# Patient Record
Sex: MALE | Race: WHITE | NOT HISPANIC OR LATINO | URBAN - METROPOLITAN AREA
[De-identification: names, ages, dates, MRNs, and addresses within clinical notes are randomized per-mention and may not be internally consistent; named-entity substitution may affect disease eponyms.]

---

## 2021-03-26 ENCOUNTER — INPATIENT (INPATIENT)
Facility: HOSPITAL | Age: 66
LOS: 4 days | Discharge: HOME HEALTH | DRG: 177 | End: 2021-03-31
Attending: INTERNAL MEDICINE | Admitting: EMERGENCY MEDICINE
Payer: MEDICARE

## 2021-03-26 VITALS — WEIGHT: 199.96 LBS | OXYGEN SATURATION: 86 %

## 2021-03-26 DIAGNOSIS — R09.02 HYPOXEMIA: ICD-10-CM

## 2021-03-26 LAB
ALBUMIN SERPL ELPH-MCNC: 2.6 G/DL — LOW (ref 3.3–5)
ALP SERPL-CCNC: 60 U/L — SIGNIFICANT CHANGE UP (ref 40–120)
ALT FLD-CCNC: 120 U/L — HIGH (ref 12–78)
ANION GAP SERPL CALC-SCNC: 13 MMOL/L — SIGNIFICANT CHANGE UP (ref 5–17)
AST SERPL-CCNC: 130 U/L — HIGH (ref 15–37)
BASE EXCESS BLDV CALC-SCNC: -1.6 MMOL/L — SIGNIFICANT CHANGE UP (ref -2–2)
BASOPHILS # BLD AUTO: 0.01 K/UL — SIGNIFICANT CHANGE UP (ref 0–0.2)
BASOPHILS NFR BLD AUTO: 0.1 % — SIGNIFICANT CHANGE UP (ref 0–2)
BILIRUB SERPL-MCNC: 0.7 MG/DL — SIGNIFICANT CHANGE UP (ref 0.2–1.2)
BUN SERPL-MCNC: 20 MG/DL — SIGNIFICANT CHANGE UP (ref 7–23)
CALCIUM SERPL-MCNC: 8.3 MG/DL — LOW (ref 8.5–10.1)
CHLORIDE SERPL-SCNC: 99 MMOL/L — SIGNIFICANT CHANGE UP (ref 96–108)
CO2 SERPL-SCNC: 22 MMOL/L — SIGNIFICANT CHANGE UP (ref 22–31)
CREAT SERPL-MCNC: 1.32 MG/DL — HIGH (ref 0.5–1.3)
D DIMER BLD IA.RAPID-MCNC: 345 NG/ML DDU — HIGH
EOSINOPHIL # BLD AUTO: 0 K/UL — SIGNIFICANT CHANGE UP (ref 0–0.5)
EOSINOPHIL NFR BLD AUTO: 0 % — SIGNIFICANT CHANGE UP (ref 0–6)
GLUCOSE SERPL-MCNC: 258 MG/DL — HIGH (ref 70–99)
HCO3 BLDV-SCNC: 22 MMOL/L — SIGNIFICANT CHANGE UP (ref 21–29)
HCT VFR BLD CALC: 42.7 % — SIGNIFICANT CHANGE UP (ref 39–50)
HGB BLD-MCNC: 14.5 G/DL — SIGNIFICANT CHANGE UP (ref 13–17)
IMM GRANULOCYTES NFR BLD AUTO: 1.1 % — SIGNIFICANT CHANGE UP (ref 0–1.5)
LACTATE SERPL-SCNC: 2.2 MMOL/L — HIGH (ref 0.7–2)
LYMPHOCYTES # BLD AUTO: 1.09 K/UL — SIGNIFICANT CHANGE UP (ref 1–3.3)
LYMPHOCYTES # BLD AUTO: 10.6 % — LOW (ref 13–44)
MCHC RBC-ENTMCNC: 29.7 PG — SIGNIFICANT CHANGE UP (ref 27–34)
MCHC RBC-ENTMCNC: 34 GM/DL — SIGNIFICANT CHANGE UP (ref 32–36)
MCV RBC AUTO: 87.3 FL — SIGNIFICANT CHANGE UP (ref 80–100)
MONOCYTES # BLD AUTO: 0.63 K/UL — SIGNIFICANT CHANGE UP (ref 0–0.9)
MONOCYTES NFR BLD AUTO: 6.1 % — SIGNIFICANT CHANGE UP (ref 2–14)
NEUTROPHILS # BLD AUTO: 8.46 K/UL — HIGH (ref 1.8–7.4)
NEUTROPHILS NFR BLD AUTO: 82.1 % — HIGH (ref 43–77)
NT-PROBNP SERPL-SCNC: 60 PG/ML — SIGNIFICANT CHANGE UP (ref 0–125)
PCO2 BLDV: 34 MMHG — LOW (ref 42–55)
PH BLDV: 7.42 — SIGNIFICANT CHANGE UP (ref 7.35–7.45)
PLATELET # BLD AUTO: 467 K/UL — HIGH (ref 150–400)
PO2 BLDV: 174 MMHG — HIGH (ref 25–45)
POTASSIUM SERPL-MCNC: 4 MMOL/L — SIGNIFICANT CHANGE UP (ref 3.5–5.3)
POTASSIUM SERPL-SCNC: 4 MMOL/L — SIGNIFICANT CHANGE UP (ref 3.5–5.3)
PROT SERPL-MCNC: 7.7 GM/DL — SIGNIFICANT CHANGE UP (ref 6–8.3)
RBC # BLD: 4.89 M/UL — SIGNIFICANT CHANGE UP (ref 4.2–5.8)
RBC # FLD: 12.8 % — SIGNIFICANT CHANGE UP (ref 10.3–14.5)
SAO2 % BLDV: 99 % — HIGH (ref 67–88)
SODIUM SERPL-SCNC: 134 MMOL/L — LOW (ref 135–145)
TROPONIN I SERPL-MCNC: <0.015 NG/ML — SIGNIFICANT CHANGE UP (ref 0.01–0.04)
WBC # BLD: 10.3 K/UL — SIGNIFICANT CHANGE UP (ref 3.8–10.5)
WBC # FLD AUTO: 10.3 K/UL — SIGNIFICANT CHANGE UP (ref 3.8–10.5)

## 2021-03-26 PROCEDURE — U0005: CPT

## 2021-03-26 PROCEDURE — 93005 ELECTROCARDIOGRAM TRACING: CPT

## 2021-03-26 PROCEDURE — 85027 COMPLETE CBC AUTOMATED: CPT

## 2021-03-26 PROCEDURE — 82962 GLUCOSE BLOOD TEST: CPT

## 2021-03-26 PROCEDURE — 82248 BILIRUBIN DIRECT: CPT

## 2021-03-26 PROCEDURE — 85610 PROTHROMBIN TIME: CPT

## 2021-03-26 PROCEDURE — 83605 ASSAY OF LACTIC ACID: CPT

## 2021-03-26 PROCEDURE — 80053 COMPREHEN METABOLIC PANEL: CPT

## 2021-03-26 PROCEDURE — 36415 COLL VENOUS BLD VENIPUNCTURE: CPT

## 2021-03-26 PROCEDURE — U0003: CPT

## 2021-03-26 PROCEDURE — 99285 EMERGENCY DEPT VISIT HI MDM: CPT

## 2021-03-26 PROCEDURE — 80076 HEPATIC FUNCTION PANEL: CPT

## 2021-03-26 PROCEDURE — 94640 AIRWAY INHALATION TREATMENT: CPT

## 2021-03-26 PROCEDURE — 83036 HEMOGLOBIN GLYCOSYLATED A1C: CPT

## 2021-03-26 PROCEDURE — 71045 X-RAY EXAM CHEST 1 VIEW: CPT | Mod: 26

## 2021-03-26 PROCEDURE — 71275 CT ANGIOGRAPHY CHEST: CPT | Mod: 26

## 2021-03-26 PROCEDURE — 99223 1ST HOSP IP/OBS HIGH 75: CPT

## 2021-03-26 PROCEDURE — 86769 SARS-COV-2 COVID-19 ANTIBODY: CPT

## 2021-03-26 PROCEDURE — 94761 N-INVAS EAR/PLS OXIMETRY MLT: CPT

## 2021-03-26 PROCEDURE — 93010 ELECTROCARDIOGRAM REPORT: CPT

## 2021-03-26 PROCEDURE — 87086 URINE CULTURE/COLONY COUNT: CPT

## 2021-03-26 PROCEDURE — C9399: CPT

## 2021-03-26 PROCEDURE — 86803 HEPATITIS C AB TEST: CPT

## 2021-03-26 RX ORDER — ALBUTEROL 90 UG/1
2 AEROSOL, METERED ORAL ONCE
Refills: 0 | Status: COMPLETED | OUTPATIENT
Start: 2021-03-26 | End: 2021-03-26

## 2021-03-26 RX ORDER — METFORMIN HYDROCHLORIDE 850 MG/1
1 TABLET ORAL
Qty: 0 | Refills: 0 | DISCHARGE

## 2021-03-26 RX ORDER — ASPIRIN/CALCIUM CARB/MAGNESIUM 324 MG
1 TABLET ORAL
Qty: 0 | Refills: 0 | DISCHARGE

## 2021-03-26 RX ORDER — DAPAGLIFLOZIN 10 MG/1
1 TABLET, FILM COATED ORAL
Qty: 0 | Refills: 0 | DISCHARGE

## 2021-03-26 RX ORDER — DEXAMETHASONE 0.5 MG/5ML
6 ELIXIR ORAL ONCE
Refills: 0 | Status: COMPLETED | OUTPATIENT
Start: 2021-03-26 | End: 2021-03-26

## 2021-03-26 RX ORDER — OMEGA-3 ACID ETHYL ESTERS 1 G
1 CAPSULE ORAL
Qty: 0 | Refills: 0 | DISCHARGE

## 2021-03-26 RX ORDER — ACETAMINOPHEN 500 MG
650 TABLET ORAL ONCE
Refills: 0 | Status: COMPLETED | OUTPATIENT
Start: 2021-03-26 | End: 2021-03-26

## 2021-03-26 RX ORDER — SODIUM CHLORIDE 9 MG/ML
1000 INJECTION INTRAMUSCULAR; INTRAVENOUS; SUBCUTANEOUS ONCE
Refills: 0 | Status: COMPLETED | OUTPATIENT
Start: 2021-03-26 | End: 2021-03-26

## 2021-03-26 RX ORDER — LOSARTAN POTASSIUM 100 MG/1
1 TABLET, FILM COATED ORAL
Qty: 0 | Refills: 0 | DISCHARGE

## 2021-03-26 RX ADMIN — Medication 650 MILLIGRAM(S): at 22:18

## 2021-03-26 RX ADMIN — ALBUTEROL 2 PUFF(S): 90 AEROSOL, METERED ORAL at 19:40

## 2021-03-26 RX ADMIN — Medication 650 MILLIGRAM(S): at 19:40

## 2021-03-26 RX ADMIN — SODIUM CHLORIDE 1000 MILLILITER(S): 9 INJECTION INTRAMUSCULAR; INTRAVENOUS; SUBCUTANEOUS at 22:18

## 2021-03-26 RX ADMIN — Medication 6 MILLIGRAM(S): at 19:40

## 2021-03-26 NOTE — H&P ADULT - NSHPPHYSICALEXAM_GEN_ALL_CORE
Vital Signs Last 24 Hrs  T(C): 36.9 (26 Mar 2021 22:41), Max: 37.8 (26 Mar 2021 19:32)  T(F): 98.4 (26 Mar 2021 22:41), Max: 100.1 (26 Mar 2021 19:32)  HR: 64 (26 Mar 2021 22:41) (64 - 88)  BP: 101/71 (26 Mar 2021 22:41) (99/61 - 113/67)  BP(mean): 81 (26 Mar 2021 22:41) (80 - 81)  RR: 24 (26 Mar 2021 22:41) (22 - 24)  SpO2: 95% (26 Mar 2021 22:41) (86% - 96%)

## 2021-03-26 NOTE — ED PROVIDER NOTE - CARE PLAN
Principal Discharge DX:	Hypoxia  Secondary Diagnosis:	Viral pneumonia  Secondary Diagnosis:	SOB (shortness of breath)

## 2021-03-26 NOTE — H&P ADULT - HISTORY OF PRESENT ILLNESS
64 y/o M PMHx significant for CAD, Hypertension, Hyperlipidemia, and diabetes mellitus type 2 who tested positive for COVID-19 2 weeks ago presents to  for further evaluation and management of worsening cough, and shortness of breath. The patient additionally reports generalized weakness and fatigue. The patient was noted to be hypoxic SpO2 80s on room air and 90-92% on 2L/min nasal cannula.  Labs => LA 2.2, Na 134, BUN/Cr 20/1.32, Glu 258, Alb 2.6, AST//120, D-dimer 345, TnI (-) x 1. CTA Chest => No obvious pulmonary embolus. Bilateral pulmonary opacities, reflecting COVID-19 pneumonia given history.

## 2021-03-26 NOTE — ED PROVIDER NOTE - ATTENDING CONTRIBUTION TO CARE
I, Marlin Thompson MD,  performed the initial face to face bedside interview with this patient regarding history of present illness, review of symptoms and relevant past medical, social and family history.  I completed an independent physical examination.  I was the initial provider who evaluated this patient. I have signed out the follow up of any pending tests (i.e. labs, radiological studies) to the ACP.  I have communicated the patient’s plan of care and disposition with the ACP.  The history, relevant review of systems, past medical and surgical history, medical decision making, and physical examination was documented by the scribe in my presence and I attest to the accuracy of the documentation.

## 2021-03-26 NOTE — H&P ADULT - ASSESSMENT
66 y/o M PMHx significant for CAD, Hypertension, Hyperlipidemia, and diabetes mellitus type 2 who tested positive for COVID-19 2 weeks ago presents to  for further evaluation and management of worsening cough, and shortness of breath. The patient additionally reports generalized weakness and fatigue. The patient was noted to be hypoxic SpO2 80s on room air and 90-92% on 2L/min nasal cannula.  Labs => LA 2.2, Na 134, BUN/Cr 20/1.32, Glu 258, Alb 2.6, AST//120, D-dimer 345, TnI (-) x 1. CTA Chest => No obvious pulmonary embolus. Bilateral pulmonary opacities, reflecting COVID-19 pneumonia given history.    #Viral Pneumonia secondary to Novel Coronavirus Infection  ~admit to Medicine  ~cont. with O2 as needed via nasal cannula and up-titrate as needed. If on non-rebreather mask, start continuous oximetry monitoring.  ~cont. Dexamethasone 6mg IVP daily  ~cont. Remdesevir per protocol  ~cont. anti-pyretics with Acetaminophen 650 mg PO q4h PRN fever. Limit use of NSAIDs.  ~cont. HFA albuterol Q6 hour PRN via MDI. Would avoid nebulized preparations to limit risk of aerosol formation.  ~f/u am labs     #CAD/Hypertension  ~cont. ASA 81mg po daily  ~cont. Losartan 25mg po daily    #Hyperlipidemia  ~takes Lovaza 1000mg po bid    #Diabetes mellitus type 2  ~FS qAC/HS  ~cont. ISS per protocol  #Goals of Care discussion  ~at this time the patient is Full code    #Vte ppx  ~cont. LMWH 40 mg SQ daily

## 2021-03-26 NOTE — ED ADULT NURSE NOTE - OBJECTIVE STATEMENT
Pt is a 64 y/o male, A &O x 4. presents to ED with shortness of breath, was Covid + 4 weeks ago. Pt sent to ED by PCP. Denies chest pain.

## 2021-03-26 NOTE — ED PROVIDER NOTE - OBJECTIVE STATEMENT
66 y/o male, tested positive for COVID 2 weeks ago, with PMHx of recent onset of diabetes with daily medications at this time presents to the ED c/o cough and SOB. Pt states that 4 weeks ago he went to urgent care center, states he tested positive for COVID. Pt states that since then he has been quarantining, reports associated weakness, fatigue, and mild cough, otherwise with no symptoms. Pt reports he called PMD Mike today, who advised pt come to ED for further evaluation. Pt reports no medications or treatment taken for COVID, and otherwise no daily medications. Denies subjective fevers, chills, CP, SOB, abd pain. No other complaints at this time.

## 2021-03-26 NOTE — ED PROVIDER NOTE - PROGRESS NOTE DETAILS
64 yo male with a PMH of dm, htn, recently tested positive 4 weeks ago presents with foggy brain, sob, kilpatrick x 6 weeks. Pt has been having increasing symptoms with weakness and cough. Spoke with his PMD and sent to the ER for symptoms. Pt with O2 saturation in the high 80s on RA and at 90-92% on 2 L.Will check labs, covid, cxr, ekg, meds, Likely admit. -Richard Alonso PA-C CTA unremarkable. Will admit pt for covid pna, sob, kilpatrick. Pt was made aware. -Richard Alonso PA-C

## 2021-03-27 LAB
A1C WITH ESTIMATED AVERAGE GLUCOSE RESULT: 11.3 % — HIGH (ref 4–5.6)
ALBUMIN SERPL ELPH-MCNC: 2.5 G/DL — LOW (ref 3.3–5)
ALBUMIN SERPL ELPH-MCNC: 2.7 G/DL — LOW (ref 3.3–5)
ALP SERPL-CCNC: 64 U/L — SIGNIFICANT CHANGE UP (ref 40–120)
ALP SERPL-CCNC: 65 U/L — SIGNIFICANT CHANGE UP (ref 40–120)
ALT FLD-CCNC: 141 U/L — HIGH (ref 12–78)
ALT FLD-CCNC: 143 U/L — HIGH (ref 12–78)
ANION GAP SERPL CALC-SCNC: 10 MMOL/L — SIGNIFICANT CHANGE UP (ref 5–17)
AST SERPL-CCNC: 111 U/L — HIGH (ref 15–37)
AST SERPL-CCNC: 111 U/L — HIGH (ref 15–37)
BILIRUB DIRECT SERPL-MCNC: 0.1 MG/DL — SIGNIFICANT CHANGE UP (ref 0–0.2)
BILIRUB INDIRECT FLD-MCNC: 0.5 MG/DL — SIGNIFICANT CHANGE UP (ref 0.2–1)
BILIRUB SERPL-MCNC: 0.6 MG/DL — SIGNIFICANT CHANGE UP (ref 0.2–1.2)
BILIRUB SERPL-MCNC: 0.6 MG/DL — SIGNIFICANT CHANGE UP (ref 0.2–1.2)
BUN SERPL-MCNC: 28 MG/DL — HIGH (ref 7–23)
CALCIUM SERPL-MCNC: 8.9 MG/DL — SIGNIFICANT CHANGE UP (ref 8.5–10.1)
CHLORIDE SERPL-SCNC: 100 MMOL/L — SIGNIFICANT CHANGE UP (ref 96–108)
CO2 SERPL-SCNC: 24 MMOL/L — SIGNIFICANT CHANGE UP (ref 22–31)
COVID-19 SPIKE DOMAIN AB INTERP: POSITIVE
COVID-19 SPIKE DOMAIN ANTIBODY RESULT: 91.4 U/ML — HIGH
CREAT SERPL-MCNC: 1.28 MG/DL — SIGNIFICANT CHANGE UP (ref 0.5–1.3)
CRP SERPL-MCNC: 135 MG/L — HIGH
ESTIMATED AVERAGE GLUCOSE: 278 MG/DL — HIGH (ref 68–114)
FERRITIN SERPL-MCNC: 2675 NG/ML — HIGH (ref 30–400)
GLUCOSE SERPL-MCNC: 324 MG/DL — HIGH (ref 70–99)
HCT VFR BLD CALC: 41.6 % — SIGNIFICANT CHANGE UP (ref 39–50)
HCV AB S/CO SERPL IA: 0.09 S/CO — SIGNIFICANT CHANGE UP (ref 0–0.99)
HCV AB SERPL-IMP: SIGNIFICANT CHANGE UP
HGB BLD-MCNC: 14.1 G/DL — SIGNIFICANT CHANGE UP (ref 13–17)
INR BLD: 1.43 RATIO — HIGH (ref 0.88–1.16)
MCHC RBC-ENTMCNC: 29.7 PG — SIGNIFICANT CHANGE UP (ref 27–34)
MCHC RBC-ENTMCNC: 33.9 GM/DL — SIGNIFICANT CHANGE UP (ref 32–36)
MCV RBC AUTO: 87.8 FL — SIGNIFICANT CHANGE UP (ref 80–100)
PLATELET # BLD AUTO: 556 K/UL — HIGH (ref 150–400)
POTASSIUM SERPL-MCNC: 4.8 MMOL/L — SIGNIFICANT CHANGE UP (ref 3.5–5.3)
POTASSIUM SERPL-SCNC: 4.8 MMOL/L — SIGNIFICANT CHANGE UP (ref 3.5–5.3)
PROCALCITONIN SERPL-MCNC: 0.53 NG/ML — HIGH (ref 0.02–0.1)
PROT SERPL-MCNC: 7.8 GM/DL — SIGNIFICANT CHANGE UP (ref 6–8.3)
PROT SERPL-MCNC: 7.8 GM/DL — SIGNIFICANT CHANGE UP (ref 6–8.3)
PROTHROM AB SERPL-ACNC: 16.5 SEC — HIGH (ref 10.6–13.6)
RBC # BLD: 4.74 M/UL — SIGNIFICANT CHANGE UP (ref 4.2–5.8)
RBC # FLD: 13.2 % — SIGNIFICANT CHANGE UP (ref 10.3–14.5)
SARS-COV-2 IGG+IGM SERPL QL IA: 91.4 U/ML — HIGH
SARS-COV-2 IGG+IGM SERPL QL IA: POSITIVE
SARS-COV-2 RNA SPEC QL NAA+PROBE: DETECTED
SODIUM SERPL-SCNC: 134 MMOL/L — LOW (ref 135–145)
WBC # BLD: 9.14 K/UL — SIGNIFICANT CHANGE UP (ref 3.8–10.5)
WBC # FLD AUTO: 9.14 K/UL — SIGNIFICANT CHANGE UP (ref 3.8–10.5)

## 2021-03-27 PROCEDURE — 99233 SBSQ HOSP IP/OBS HIGH 50: CPT | Mod: CS

## 2021-03-27 RX ORDER — REMDESIVIR 5 MG/ML
INJECTION INTRAVENOUS
Refills: 0 | Status: COMPLETED | OUTPATIENT
Start: 2021-03-27 | End: 2021-03-31

## 2021-03-27 RX ORDER — INSULIN LISPRO 100/ML
15 VIAL (ML) SUBCUTANEOUS ONCE
Refills: 0 | Status: COMPLETED | OUTPATIENT
Start: 2021-03-27 | End: 2021-03-27

## 2021-03-27 RX ORDER — DEXTROSE 50 % IN WATER 50 %
12.5 SYRINGE (ML) INTRAVENOUS ONCE
Refills: 0 | Status: DISCONTINUED | OUTPATIENT
Start: 2021-03-27 | End: 2021-03-31

## 2021-03-27 RX ORDER — INSULIN GLARGINE 100 [IU]/ML
15 INJECTION, SOLUTION SUBCUTANEOUS AT BEDTIME
Refills: 0 | Status: DISCONTINUED | OUTPATIENT
Start: 2021-03-27 | End: 2021-03-28

## 2021-03-27 RX ORDER — ASPIRIN/CALCIUM CARB/MAGNESIUM 324 MG
81 TABLET ORAL DAILY
Refills: 0 | Status: DISCONTINUED | OUTPATIENT
Start: 2021-03-27 | End: 2021-03-31

## 2021-03-27 RX ORDER — SODIUM CHLORIDE 9 MG/ML
1000 INJECTION, SOLUTION INTRAVENOUS
Refills: 0 | Status: DISCONTINUED | OUTPATIENT
Start: 2021-03-27 | End: 2021-03-31

## 2021-03-27 RX ORDER — INSULIN LISPRO 100/ML
2 VIAL (ML) SUBCUTANEOUS ONCE
Refills: 0 | Status: COMPLETED | OUTPATIENT
Start: 2021-03-27 | End: 2021-03-27

## 2021-03-27 RX ORDER — ACETAMINOPHEN 500 MG
650 TABLET ORAL EVERY 4 HOURS
Refills: 0 | Status: DISCONTINUED | OUTPATIENT
Start: 2021-03-27 | End: 2021-03-31

## 2021-03-27 RX ORDER — REMDESIVIR 5 MG/ML
100 INJECTION INTRAVENOUS EVERY 24 HOURS
Refills: 0 | Status: COMPLETED | OUTPATIENT
Start: 2021-03-28 | End: 2021-03-31

## 2021-03-27 RX ORDER — ALBUTEROL 90 UG/1
2 AEROSOL, METERED ORAL EVERY 4 HOURS
Refills: 0 | Status: DISCONTINUED | OUTPATIENT
Start: 2021-03-27 | End: 2021-03-31

## 2021-03-27 RX ORDER — DEXTROSE 50 % IN WATER 50 %
25 SYRINGE (ML) INTRAVENOUS ONCE
Refills: 0 | Status: DISCONTINUED | OUTPATIENT
Start: 2021-03-27 | End: 2021-03-31

## 2021-03-27 RX ORDER — ONDANSETRON 8 MG/1
4 TABLET, FILM COATED ORAL EVERY 6 HOURS
Refills: 0 | Status: DISCONTINUED | OUTPATIENT
Start: 2021-03-27 | End: 2021-03-31

## 2021-03-27 RX ORDER — INSULIN LISPRO 100/ML
VIAL (ML) SUBCUTANEOUS AT BEDTIME
Refills: 0 | Status: DISCONTINUED | OUTPATIENT
Start: 2021-03-27 | End: 2021-03-31

## 2021-03-27 RX ORDER — INSULIN GLARGINE 100 [IU]/ML
15 INJECTION, SOLUTION SUBCUTANEOUS ONCE
Refills: 0 | Status: COMPLETED | OUTPATIENT
Start: 2021-03-27 | End: 2021-03-27

## 2021-03-27 RX ORDER — GUAIFENESIN/DEXTROMETHORPHAN 600MG-30MG
10 TABLET, EXTENDED RELEASE 12 HR ORAL EVERY 4 HOURS
Refills: 0 | Status: DISCONTINUED | OUTPATIENT
Start: 2021-03-27 | End: 2021-03-31

## 2021-03-27 RX ORDER — GLUCAGON INJECTION, SOLUTION 0.5 MG/.1ML
1 INJECTION, SOLUTION SUBCUTANEOUS ONCE
Refills: 0 | Status: DISCONTINUED | OUTPATIENT
Start: 2021-03-27 | End: 2021-03-31

## 2021-03-27 RX ORDER — LOSARTAN POTASSIUM 100 MG/1
25 TABLET, FILM COATED ORAL DAILY
Refills: 0 | Status: DISCONTINUED | OUTPATIENT
Start: 2021-03-27 | End: 2021-03-31

## 2021-03-27 RX ORDER — INSULIN LISPRO 100/ML
VIAL (ML) SUBCUTANEOUS
Refills: 0 | Status: DISCONTINUED | OUTPATIENT
Start: 2021-03-27 | End: 2021-03-31

## 2021-03-27 RX ORDER — DEXTROSE 50 % IN WATER 50 %
15 SYRINGE (ML) INTRAVENOUS ONCE
Refills: 0 | Status: DISCONTINUED | OUTPATIENT
Start: 2021-03-27 | End: 2021-03-31

## 2021-03-27 RX ORDER — INSULIN LISPRO 100/ML
VIAL (ML) SUBCUTANEOUS AT BEDTIME
Refills: 0 | Status: DISCONTINUED | OUTPATIENT
Start: 2021-03-27 | End: 2021-03-27

## 2021-03-27 RX ORDER — ENOXAPARIN SODIUM 100 MG/ML
40 INJECTION SUBCUTANEOUS DAILY
Refills: 0 | Status: DISCONTINUED | OUTPATIENT
Start: 2021-03-27 | End: 2021-03-31

## 2021-03-27 RX ORDER — INSULIN GLARGINE 100 [IU]/ML
10 INJECTION, SOLUTION SUBCUTANEOUS
Refills: 0 | Status: DISCONTINUED | OUTPATIENT
Start: 2021-03-27 | End: 2021-03-27

## 2021-03-27 RX ORDER — REMDESIVIR 5 MG/ML
200 INJECTION INTRAVENOUS EVERY 24 HOURS
Refills: 0 | Status: COMPLETED | OUTPATIENT
Start: 2021-03-27 | End: 2021-03-27

## 2021-03-27 RX ORDER — DEXAMETHASONE 0.5 MG/5ML
6 ELIXIR ORAL DAILY
Refills: 0 | Status: DISCONTINUED | OUTPATIENT
Start: 2021-03-27 | End: 2021-03-31

## 2021-03-27 RX ADMIN — Medication 2 UNIT(S): at 02:34

## 2021-03-27 RX ADMIN — Medication 8: at 22:51

## 2021-03-27 RX ADMIN — Medication 4: at 09:19

## 2021-03-27 RX ADMIN — ENOXAPARIN SODIUM 40 MILLIGRAM(S): 100 INJECTION SUBCUTANEOUS at 09:22

## 2021-03-27 RX ADMIN — Medication 6 MILLIGRAM(S): at 09:22

## 2021-03-27 RX ADMIN — Medication 81 MILLIGRAM(S): at 09:22

## 2021-03-27 RX ADMIN — INSULIN GLARGINE 15 UNIT(S): 100 INJECTION, SOLUTION SUBCUTANEOUS at 22:51

## 2021-03-27 RX ADMIN — REMDESIVIR 500 MILLIGRAM(S): 5 INJECTION INTRAVENOUS at 03:10

## 2021-03-27 RX ADMIN — Medication 5: at 16:31

## 2021-03-27 RX ADMIN — Medication 15 UNIT(S): at 12:11

## 2021-03-27 RX ADMIN — INSULIN GLARGINE 10 UNIT(S): 100 INJECTION, SOLUTION SUBCUTANEOUS at 16:32

## 2021-03-27 NOTE — CONSULT NOTE ADULT - SUBJECTIVE AND OBJECTIVE BOX
Patient is a 65y old  Male who presents with a chief complaint of Cough, fatigue, weakness (27 Mar 2021 10:38)    HPI:  66 y/o M PMHx significant for CAD, Hypertension, Hyperlipidemia, and diabetes mellitus type 2 who tested positive for COVID-19 2 weeks ago presents to  for further evaluation and management of worsening cough, and shortness of breath. The patient additionally reports generalized weakness and fatigue. The patient was noted to be hypoxic SpO2 80s on room air and 90-92% on 2L/min nasal cannula. Labs => LA 2.2, Na 134, BUN/Cr 20/1.32, Glu 258, Alb 2.6, AST//120, D-dimer 345, TnI (-) x 1. CTA Chest => No obvious pulmonary embolus. Bilateral pulmonary opacities, reflecting COVID-19 pneumonia given history. Was given remdesivir, decadron.       PMH: as above  PSH: as above  Meds: per reconciliation sheet, noted below  MEDICATIONS  (STANDING):  aspirin enteric coated 81 milliGRAM(s) Oral daily  dexAMETHasone  Injectable 6 milliGRAM(s) IV Push daily  dextrose 40% Gel 15 Gram(s) Oral once  dextrose 5%. 1000 milliLiter(s) (50 mL/Hr) IV Continuous <Continuous>  dextrose 5%. 1000 milliLiter(s) (100 mL/Hr) IV Continuous <Continuous>  dextrose 50% Injectable 25 Gram(s) IV Push once  dextrose 50% Injectable 12.5 Gram(s) IV Push once  dextrose 50% Injectable 25 Gram(s) IV Push once  enoxaparin Injectable 40 milliGRAM(s) SubCutaneous daily  glucagon  Injectable 1 milliGRAM(s) IntraMuscular once  insulin lispro (ADMELOG) corrective regimen sliding scale   SubCutaneous three times a day before meals  insulin lispro (ADMELOG) corrective regimen sliding scale   SubCutaneous at bedtime  insulin lispro Injectable (ADMELOG) 15 Unit(s) SubCutaneous once  losartan 25 milliGRAM(s) Oral daily  remdesivir  IVPB   IV Intermittent       Allergies    No Known Allergies    Intolerances      Social: no smoking, no alcohol, no illegal drugs; no recent travel, no exposure to TB  FAMILY HISTORY:  No pertinent family history in first degree relatives       no history of premature cardiovascular disease in first degree relatives    ROS: no HA, no dizziness, no sore throat, no blurry vision, no CP, no palpitations, no abdominal pain, no diarrhea, no N/V, no dysuria, no leg pain, no claudication, no rash, no joint aches, no rectal pain or bleeding, no night sweats  All other systems reviewed and are negative    Vital Signs Last 24 Hrs  T(C): 36.3 (27 Mar 2021 08:31), Max: 37.8 (26 Mar 2021 19:32)  T(F): 97.3 (27 Mar 2021 08:31), Max: 100.1 (26 Mar 2021 19:32)  HR: 60 (27 Mar 2021 08:31) (60 - 88)  BP: 111/67 (27 Mar 2021 08:31) (99/61 - 114/77)  BP(mean): 94 (27 Mar 2021 02:33) (80 - 94)  RR: 20 (27 Mar 2021 08:31) (20 - 24)  SpO2: 94% (27 Mar 2021 08:31) (86% - 96%)  Daily     Daily     PE:  Constitutional: frail looking  HEENT: NC/AT, EOMI, PERRLA, conjunctivae clear; ears and nose atraumatic; pharynx benign  Neck: supple; thyroid not palpable  Back: no tenderness  Respiratory: decreased breath sounds, crackles   Cardiovascular: S1S2 regular, no murmurs  Abdomen: soft, not tender, not distended, positive BS; liver and spleen WNL  Genitourinary: no suprapubic tenderness  Lymphatic: no LN palpable  Musculoskeletal: no muscle tenderness, no joint swelling or tenderness  Extremities: no pedal edema  Neurological/ Psychiatric: AxOx3, Judgement and insight normal;  moving all extremities  Skin: no rashes; no palpable lesions    Labs: all available labs reviewed                        14.1   9.14  )-----------( 556      ( 27 Mar 2021 08:32 )             41.6     03-27    134<L>  |  100  |  28<H>  ----------------------------<  324<H>  4.8   |  24  |  1.28    Ca    8.9      27 Mar 2021 08:32    TPro  7.8  /  Alb  2.5<L>  /  TBili  0.6  /  DBili  0.1  /  AST  111<H>  /  ALT  141<H>  /  AlkPhos  64  03-27     LIVER FUNCTIONS - ( 27 Mar 2021 08:32 )  Alb: 2.5 g/dL / Pro: 7.8 gm/dL / ALK PHOS: 64 U/L / ALT: 141 U/L / AST: 111 U/L / GGT: x           Radiology: all available radiological tests reviewed    EXAM:  CT ANGIO CHEST PE PROTOCOL IC                             PROCEDURE DATE:  03/26/2021          INTERPRETATION:  CLINICAL INFORMATION: Positive d-dimer, cough, shortness of breath, positive COVID-19 2 weeks ago, assess PE.    PROCEDURE: CT angiography of the chest was performed with intravenous contrast utilizing dedicated PE protocol. Coronal and sagittal reconstruction images were obtained. Axial MIP images were obtained from a separate workstation.    CONTRAST/COMPLICATIONS:  IV Contrast: Omnipaque 350  90 cc administered   10 cc discarded  Oral Contrast: NONE  Complications: None reported at time of study completion    COMPARISON: None.    FINDINGS: Artifact from the patient's arm and respiratory motion degrades images.    LUNGS AND AIRWAYS: Patent central airways. Predominantly peripheral linear/ground glass opacities in both lungs with mildly patchy opacities at the lung bases.  PLEURA: Trace bilateral pleural effusion. No pneumothorax.  HEART: Cardiomegaly. No pericardial effusion.  VESSELS: Adequate contrast opacification of the pulmonary arteries. No obvious pulmonary embolus. Atherosclerosis. Normal caliber of the thoracic aorta. Ectatic ascending aorta (4.5 cm in AP diameter).  MEDIASTINUM AND ANDREA: Subcentimeter lymph nodes without lymphadenopathy.  CHEST WALL AND LOWER NECK: Mild bilateral gynecomastia.  UPPER ABDOMEN: Suggests a fatty infiltration of the liver with sparing near the gallbladder fossa. Fatty atrophy of the visualized pancreas. Nonspecific bilateral perinephric stranding. Colon diverticulosis.  BONES: Degenerative changes of the spine. Chronic mild anterior wedging of the thoracolumbar junction.    IMPRESSION:    No obvious pulmonary embolus.    Bilateral pulmonary opacities, reflecting COVID-19 pneumonia given history.    Additional findings as described.          Advanced directives addressed: full resuscitation

## 2021-03-27 NOTE — PATIENT PROFILE ADULT - NSPROEXTENSIONSOFSELF_GEN_A_NUR
Patient states he brought a pair of glasses to the ED. No glasses on patient's arrival to unit. Call made to security and no glasses confirmed to be returned to security./none

## 2021-03-27 NOTE — CONSULT NOTE ADULT - ASSESSMENT
64 y/o M PMHx significant for CAD, Hypertension, Hyperlipidemia, and diabetes mellitus type 2 who tested positive for COVID-19 2 weeks ago presents to  for further evaluation and management of worsening cough, and shortness of breath. The patient additionally reports generalized weakness and fatigue. The patient was noted to be hypoxic SpO2 80s on room air and 90-92% on 2L/min nasal cannula. Labs => LA 2.2, Na 134, BUN/Cr 20/1.32, Glu 258, Alb 2.6, AST//120, D-dimer 345, TnI (-) x 1. CTA Chest => No obvious pulmonary embolus. Bilateral pulmonary opacities, reflecting COVID-19 pneumonia given history. Was given remdesivir, decadron.     1. acute respiratory failure. covid-19 viral syndrome. multifocal pneumonia   - agree with decadron 6mg daily  - on remdesivir per protocol monitor renal/hepatic function closely  - continue with steroids + antiviral  - monitor temps  - isolation precautions  - observe off antibiotics  - f/u inflammatory markers  - supportive care    2. other issues - care per medicine

## 2021-03-27 NOTE — PROGRESS NOTE ADULT - SUBJECTIVE AND OBJECTIVE BOX
HOSPITALIST PROGRESS NOTE:  SUBJECTIVE:  PCP:  Chief Complaint: Patient is a 65y old  Male who presents with a chief complaint of Cough, fatigue, weakness (26 Mar 2021 22:59)      HPI:  66 y/o M PMHx significant for CAD, Hypertension, Hyperlipidemia, and diabetes mellitus type 2 who tested positive for COVID-19 2 weeks ago presents to  for further evaluation and management of worsening cough, and shortness of breath. The patient additionally reports generalized weakness and fatigue. The patient was noted to be hypoxic SpO2 80s on room air and 90-92% on 2L/min nasal cannula.  Labs => LA 2.2, Na 134, BUN/Cr 20/1.32, Glu 258, Alb 2.6, AST//120, D-dimer 345, TnI (-) x 1. CTA Chest => No obvious pulmonary embolus. Bilateral pulmonary opacities, reflecting COVID-19 pneumonia given history.   (26 Mar 2021 22:59)    3/27:  Above reviewed       Allergies:  No Known Allergies    REVIEW OF SYSTEMS:  See HPI. All other review of systems is negative unless indicated above.     OBJECTIVE  Physical Exam:  Vital Signs:    Weight (kg): 90.7 (03-26 @ 18:36)  Vital Signs Last 24 Hrs  T(C): 36.3 (27 Mar 2021 08:31), Max: 37.8 (26 Mar 2021 19:32)  T(F): 97.3 (27 Mar 2021 08:31), Max: 100.1 (26 Mar 2021 19:32)  HR: 60 (27 Mar 2021 08:31) (60 - 88)  BP: 111/67 (27 Mar 2021 08:31) (99/61 - 114/77)  BP(mean): 94 (27 Mar 2021 02:33) (80 - 94)  RR: 20 (27 Mar 2021 08:31) (20 - 24)  SpO2: 94% (27 Mar 2021 08:31) (86% - 96%)  I&O's Summary      Constitutional: NAD, awake and alert, well-developed  Neurological: AAO x 3, no focal deficits  HEENT: PERRLA, EOMI, MMM  Neck: Soft and supple, No LAD, No JVD  Respiratory: Breath sounds are clear bilaterally, No wheezing, rales or rhonchi  Cardiovascular: S1 and S2, regular rate and rhythm; no Murmurs, gallops or rubs  Gastrointestinal: Bowel Sounds present, soft, nontender, nondistended, no guarding, no rebound tenderness  Back: No CVA tenderness   Extremities: No peripheral edema  Vascular: 2+ peripheral pulses  Musculoskeletal: 5/5 strength b/l upper and lower extremities  Skin: No rashes  Breast: Deferred  Rectal: Deferred    MEDICATIONS  (STANDING):  aspirin enteric coated 81 milliGRAM(s) Oral daily  dexAMETHasone  Injectable 6 milliGRAM(s) IV Push daily  dextrose 40% Gel 15 Gram(s) Oral once  dextrose 5%. 1000 milliLiter(s) (50 mL/Hr) IV Continuous <Continuous>  dextrose 5%. 1000 milliLiter(s) (100 mL/Hr) IV Continuous <Continuous>  dextrose 50% Injectable 25 Gram(s) IV Push once  dextrose 50% Injectable 12.5 Gram(s) IV Push once  dextrose 50% Injectable 25 Gram(s) IV Push once  enoxaparin Injectable 40 milliGRAM(s) SubCutaneous daily  glucagon  Injectable 1 milliGRAM(s) IntraMuscular once  insulin lispro (ADMELOG) corrective regimen sliding scale   SubCutaneous three times a day before meals  insulin lispro (ADMELOG) corrective regimen sliding scale   SubCutaneous at bedtime  losartan 25 milliGRAM(s) Oral daily  remdesivir  IVPB   IV Intermittent       LABS: All Labs Reviewed:                        14.1   9.14  )-----------( 556      ( 27 Mar 2021 08:32 )             41.6     03-27    134<L>  |  100  |  28<H>  ----------------------------<  324<H>  4.8   |  24  |  1.28    Ca    8.9      27 Mar 2021 08:32    TPro  7.8  /  Alb  2.5<L>  /  TBili  0.6  /  DBili  0.1  /  AST  111<H>  /  ALT  141<H>  /  AlkPhos  64  03-27    PT/INR - ( 27 Mar 2021 08:32 )   PT: 16.5 sec;   INR: 1.43 ratio           CARDIAC MARKERS ( 26 Mar 2021 18:55 )  <0.015 ng/mL / x     / x     / x     / x            RADIOLOGY/EKG:    < from: Xray Chest 1 View-PORTABLE IMMEDIATE (03.26.21 @ 19:43) >      INTERPRETATION:  AP erect chest on March 26, 2021 at 7:31 PM.    COMPARISON: None available.    Heart magnified by technique.    Scattered mid laterallung infiltrates was some extension to the lung bases noted. Above findings are consistent with Covid pneumonia.    IMPRESSION: Bilateral infiltrates as above.        < end of copied text >  < from: CT Angio Chest PE Protocol w/ IV Cont (03.26.21 @ 21:06) >    IMPRESSION:    No obvious pulmonary embolus.    Bilateral pulmonary opacities, reflecting COVID-19 pneumonia given history.    Additional findings as described.        < end of copied text >           HOSPITALIST PROGRESS NOTE:  SUBJECTIVE:  PCP:  Chief Complaint: Patient is a 65y old  Male who presents with a chief complaint of Cough, fatigue, weakness (26 Mar 2021 22:59)      HPI:  66 y/o M PMHx significant for CAD, Hypertension, Hyperlipidemia, and diabetes mellitus type 2 who tested positive for COVID-19 2 weeks ago presents to  for further evaluation and management of worsening cough, and shortness of breath. The patient additionally reports generalized weakness and fatigue. The patient was noted to be hypoxic SpO2 80s on room air and 90-92% on 2L/min nasal cannula.  Labs => LA 2.2, Na 134, BUN/Cr 20/1.32, Glu 258, Alb 2.6, AST//120, D-dimer 345, TnI (-) x 1. CTA Chest => No obvious pulmonary embolus. Bilateral pulmonary opacities, reflecting COVID-19 pneumonia given history.   (26 Mar 2021 22:59)    3/27:  Above reviewed; patients sugars uncontrolled; no other complaints other than hes agitated that hes here       Allergies:  No Known Allergies    REVIEW OF SYSTEMS:  See HPI. All other review of systems is negative unless indicated above.     OBJECTIVE  Physical Exam:  Vital Signs Last 24 Hrs  T(C): 36.3 (27 Mar 2021 08:31), Max: 37.8 (26 Mar 2021 19:32)  T(F): 97.3 (27 Mar 2021 08:31), Max: 100.1 (26 Mar 2021 19:32)  HR: 60 (27 Mar 2021 08:31) (60 - 88)  BP: 111/67 (27 Mar 2021 08:31) (99/61 - 114/77)  BP(mean): 94 (27 Mar 2021 02:33) (80 - 94)  RR: 20 (27 Mar 2021 08:31) (20 - 24)  SpO2: 94% (27 Mar 2021 08:31) (86% - 96%)      Constitutional: NAD, awake and alert, well-developed  Neurological: AAO x 3, no focal deficits  HEENT: PERRLA, EOMI, MMM  Neck: Soft and supple, No LAD, No JVD  Respiratory: Breath sounds are clear bilaterally, No wheezing, rales or rhonchi  Cardiovascular: S1 and S2, regular rate and rhythm; no Murmurs, gallops or rubs  Gastrointestinal: Bowel Sounds present, soft, nontender, nondistended, no guarding, no rebound tenderness  Back: No CVA tenderness   Extremities: No peripheral edema  Vascular: 2+ peripheral pulses  Musculoskeletal: 5/5 strength b/l upper and lower extremities  Skin: No rashes  Breast: Deferred  Rectal: Deferred    MEDICATIONS  (STANDING):  aspirin enteric coated 81 milliGRAM(s) Oral daily  dexAMETHasone  Injectable 6 milliGRAM(s) IV Push daily  dextrose 40% Gel 15 Gram(s) Oral once  dextrose 5%. 1000 milliLiter(s) (50 mL/Hr) IV Continuous <Continuous>  dextrose 5%. 1000 milliLiter(s) (100 mL/Hr) IV Continuous <Continuous>  dextrose 50% Injectable 25 Gram(s) IV Push once  dextrose 50% Injectable 12.5 Gram(s) IV Push once  dextrose 50% Injectable 25 Gram(s) IV Push once  enoxaparin Injectable 40 milliGRAM(s) SubCutaneous daily  glucagon  Injectable 1 milliGRAM(s) IntraMuscular once  insulin lispro (ADMELOG) corrective regimen sliding scale   SubCutaneous three times a day before meals  insulin lispro (ADMELOG) corrective regimen sliding scale   SubCutaneous at bedtime  losartan 25 milliGRAM(s) Oral daily  remdesivir  IVPB   IV Intermittent     Lab Results:  CBC  CBC Full  -  ( 27 Mar 2021 08:32 )  WBC Count : 9.14 K/uL  RBC Count : 4.74 M/uL  Hemoglobin : 14.1 g/dL  Hematocrit : 41.6 %  Platelet Count - Automated : 556 K/uL  Mean Cell Volume : 87.8 fl  Mean Cell Hemoglobin : 29.7 pg  Mean Cell Hemoglobin Concentration : 33.9 gm/dL  Auto Neutrophil # : x  Auto Lymphocyte # : x  Auto Monocyte # : x  Auto Eosinophil # : x  Auto Basophil # : x  Auto Neutrophil % : x  Auto Lymphocyte % : x  Auto Monocyte % : x  Auto Eosinophil % : x  Auto Basophil % : x    .		Differential:	[] Automated		[] Manual  Chemistry                        14.1   9.14  )-----------( 556      ( 27 Mar 2021 08:32 )             41.6     03-27    134<L>  |  100  |  28<H>  ----------------------------<  324<H>  4.8   |  24  |  1.28    Ca    8.9      27 Mar 2021 08:32    TPro  7.8  /  Alb  2.5<L>  /  TBili  0.6  /  DBili  0.1  /  AST  111<H>  /  ALT  141<H>  /  AlkPhos  64  03-27    LIVER FUNCTIONS - ( 27 Mar 2021 08:32 )  Alb: 2.5 g/dL / Pro: 7.8 gm/dL / ALK PHOS: 64 U/L / ALT: 141 U/L / AST: 111 U/L / GGT: x           PT/INR - ( 27 Mar 2021 08:32 )   PT: 16.5 sec;   INR: 1.43 ratio         RADIOLOGY/EKG:    < from: Xray Chest 1 View-PORTABLE IMMEDIATE (03.26.21 @ 19:43) >      INTERPRETATION:  AP erect chest on March 26, 2021 at 7:31 PM.    COMPARISON: None available.    Heart magnified by technique.    Scattered mid laterallung infiltrates was some extension to the lung bases noted. Above findings are consistent with Covid pneumonia.    IMPRESSION: Bilateral infiltrates as above.        < end of copied text >  < from: CT Angio Chest PE Protocol w/ IV Cont (03.26.21 @ 21:06) >    IMPRESSION:    No obvious pulmonary embolus.    Bilateral pulmonary opacities, reflecting COVID-19 pneumonia given history.    Additional findings as described.        < end of copied text >

## 2021-03-27 NOTE — PATIENT PROFILE ADULT - NSPROREFERSVCHOMEDIABETES_GEN_A_NUR
DC instructions and scripts given and reviewed with pt. Opportunities for questions and clarification provided, verbalized understanding. Has voided >250 ml since duarte removal    Signed copy of AVS placed on chart. Pt to DC home with family at this time. no

## 2021-03-27 NOTE — PROGRESS NOTE ADULT - ASSESSMENT
66 y/o M PMHx significant for CAD, Hypertension, Hyperlipidemia, and diabetes mellitus type 2 who tested positive for COVID-19 2 weeks ago presents to  for further evaluation and management of worsening cough, and shortness of breath. The patient additionally reports generalized weakness and fatigue. The patient was noted to be hypoxic SpO2 80s on room air and 90-92% on 2L/min nasal cannula.  Labs => LA 2.2, Na 134, BUN/Cr 20/1.32, Glu 258, Alb 2.6, AST//120, D-dimer 345, TnI (-) x 1. CTA Chest => No obvious pulmonary embolus. Bilateral pulmonary opacities, reflecting COVID-19 pneumonia given history.    #Viral Pneumonia secondary to Novel Coronavirus Infection  ~cont. with NC 5L 94%   -Ddimer 345, CTA neg for PE  ~cont. Dexamethasone 6mg IVP daily#1  ~cont. Remdesevir#1 per protocol  ~cont. anti-pyretics with Acetaminophen 650 mg PO q4h PRN fever. Limit use of NSAIDs.  ~cont. HFA albuterol Q6 hour PRN via MDI.   ~f/u am labs     #CAD/Hypertension  ~cont. ASA 81mg po daily  ~cont. Losartan 25mg po daily    #Hyperlipidemia  ~takes Lovaza 1000mg po bid    #Diabetes mellitus type 2  ~FS qAC/HS  ~cont. ISS per protocol    #Goals of Care discussion  ~at this time the patient is Full code    #Vte ppx  ~cont. LMWH 40 mg SQ daily     64 y/o M PMHx significant for CAD, Hypertension, Hyperlipidemia, and diabetes mellitus type 2 who tested positive for COVID-19 2 weeks ago presents to  for further evaluation and management of worsening cough, and shortness of breath. The patient additionally reports generalized weakness and fatigue. The patient was noted to be hypoxic SpO2 80s on room air and 90-92% on 2L/min nasal cannula.  Labs => LA 2.2, Na 134, BUN/Cr 20/1.32, Glu 258, Alb 2.6, AST//120, D-dimer 345, TnI (-) x 1. CTA Chest => No obvious pulmonary embolus. Bilateral pulmonary opacities, reflecting COVID-19 pneumonia given history.    #Viral Pneumonia secondary to Novel Coronavirus Infection  ~cont. with NC 5L 94%   -Ddimer 345, CTA neg for PE  ~cont. Dexamethasone 6mg IVP daily#1  ~cont. Remdesevir#1 per protocol  ~cont. anti-pyretics with Acetaminophen 650 mg PO q4h PRN fever. Limit use of NSAIDs.  ~cont. HFA albuterol Q6 hour PRN via MDI.   ~f/u am labs     #CAD/Hypertension  ~cont. ASA 81mg po daily  ~cont. Losartan 25mg po daily    #Hyperlipidemia  ~takes Lovaza 1000mg po bid    #Diabetes mellitus type 2 - uncontrolled due to steroids  ~FS qAC/HS  ~cont. ISS per protocol  -Start Lantus 10 BID    #Goals of Care discussion  ~s Full code    #Vte ppx  ~cont. LMWH 40 mg SQ daily

## 2021-03-27 NOTE — PATIENT PROFILE ADULT - BILL PAYMENT
[facility] Medicine  Discharge Summary      Patient Name: Denise Parnell  MRN: [id number]  Admission Date: 11/24/2019  Hospital Length of Stay: 0 days  Discharge Date and Time:  11/27/2019 2:35 PM  Attending Physician: Davidson Rodriguez MD   Discharging Provider: Davidson Rodriguez MD  Primary Care Provider: Franklyn Sheppard NP      HPI:   The patient is a 38-year-old female with history of asthma. She presented to the ED after being electrocuted while she was cooking, PTA. She reports that she was cooking on a frying pan, using an electric stove. She did not remember the event. Per ED physician, EMS reported that her  heard her being electrocuted and ran to her side. She felt to the ground and lost consciousness briefly. EMS found her conscious and ambulatory at the scene.  She reports being fatigued with constant severe sharp pain in her left hand and entire left arm, worse with movement, slightly improved with pain medication given in the ED.    She states that she has been in her usual state health until this incident. She reports some shortness of breath, not more than usual.  She denies chest pain, abdominal pain, urinary symptoms.    Workup in the ED was unremarkable.  Laboratory studies were unremarkable.  Cardiac enzymes were negative.  EKG, personally reviewed, showed sinus rhythm with no ST elevation.  Telemetry showed no arrhythmia.    * No surgery found *      Hospital Course:   Patient was admitted to the hospital and observed. General surgery and Neurology were consulted. Neurology started the patient on Gabapentin for neuropathy and MRI brachial plexus was done that did not show abnormality. Patient pain was controlled with Norco. Patient was also seen by PT and OT and outpatient therapy was advised. Patient will also follow up with neurologist for outpatient EMG.    Discharge Instructions  The patient was discharged in the care of her , with discharge  instructions were reviewed in written and verbal form. All pertinent questions were discussed and prescriptions were provided for Norco and Gabapentin. The patient will follow up in 1 week or sooner as needed with the PCP, and the patient is on board with the plan. Patient was also given 2 week off from the work.        Vitals:    11/27/19 0315 11/27/19 0500 11/27/19 0701 11/27/19 1100   BP: (!) 99/55  (!) 97/53 110/67   BP Location: Right arm      Patient Position: Lying      Pulse: 73  82 81   Resp: 16  16 17   Temp: 98.3 °F (36.8 °C)  98.4 °F (36.9 °C) 98.7 °F (37.1 °C)   TempSrc: Oral  Oral Oral   SpO2: (!) 94%  98% 97%   Weight:  83.8 kg (184 lb 11.9 oz)     Height:         Vitals reviewed.  Constitutional: No distress.   HENT:   Head: Atraumatic.   Cardiovascular: Normal rate, regular rhythm and normal heart sounds.   Pulmonary/Chest: Effort normal. She has no wheezes.   Abdominal: Soft. Bowel sounds are normal. She exhibits no distension and no mass. No tenderness  Neurological: Still has tenderness on LUE mostly from arm to fingers however good strength and sensation.   Skin: Skin is warm and dry.       Consults:   Consults (From admission, onward)        Status Ordering Provider     Inpatient consult to General Surgery  Once     Provider:  Alonzo Robertson III, MD    Completed MARIAM CERVANTES     Inpatient consult to Internal Medicine  Once     Provider:  Abraham Ordaz MD    Acknowledged BENNY DONALD     Inpatient consult to Neurology  Once     Provider:  Farooq Marrero MD    Completed RUSSEL MARTINEZ     Inpatient consult to Orthopedic Surgery  Once     Provider:  Jacek Longoria MD    Acknowledged RUSSEL MARTINEZ          No new Assessment & Plan notes have been filed under this hospital service since the last note was generated.  Service: Hospital Medicine    Final Active Diagnoses:    Diagnosis Date Noted POA    PRINCIPAL PROBLEM:  Electrocution [T75.4XXA] 11/25/2019 Yes     "History of asthma [Z87.09] 11/25/2019 Not Applicable    Wrist pain [M25.539] 05/04/2018 Yes      Problems Resolved During this Admission:       Discharged Condition: good    Disposition: Home with PT/OT    Follow Up:  Follow-up Information     Franklyn Sheppard NP In 1 week.    Specialty:  Family Medicine  Contact information:  [address] [facility] [address]  [phone]             Guanaco Ratliff MD In 2 weeks.    Specialty:  Internal Medicine  Contact information:  [address] [facility] [address] [phone]                 Patient Instructions:      WALKER FOR HOME USE     Order Specific Question Answer Comments   Type of Walker: Adult (5'4"-6'6") rolling walker   With wheels? Yes    Height: 5' (1.524 m)    Weight: 83.8 kg (184 lb 11.9 oz)    Length of need (1-99 months): 99    Does patient have medical equipment at home? none    Please check all that apply: Patient's condition impairs ambulation.    Please check all that apply: Patient is unable to safely ambulate without equipment.    Please check all that apply: Patient needs help to get in and out of chair.      Ambulatory referral to Home Health   Referral Priority: Routine Referral Type: Home Health   Referral Reason: Specialty Services Required   Requested Specialty: Home Health Services   Number of Visits Requested: 1       Significant Diagnostic Studies:     MRI:    IMPRESSION:    1.  Unremarkable MRI of the left brachial plexus without and with contrast.    2.  Multilevel bulging discs as seen on the sagittal images.    3.  There is a grade 1 anterolisthesis of C4 on C5.    4.  There is no evidence of a posterior glenohumeral dislocation.        Pending Diagnostic Studies:     None         Medications:  Reconciled Home Medications:      Medication List      START taking these medications    gabapentin 300 MG capsule  Commonly known as:  NEURONTIN  Take 1 capsule (300 mg total) by mouth 3 " (three) times daily.     HYDROcodone-acetaminophen 7.5-325 mg per tablet  Commonly known as:  NORCO  Take 1 tablet by mouth every 6 (six) hours as needed for Pain.        STOP taking these medications    albuterol sulfate 2.5 mg/0.5 mL Nebu     EPINEPHrine 0.3 mg/0.3 mL Atin  Commonly known as:  EpiPen            Indwelling Lines/Drains at time of discharge:   Lines/Drains/Airways     None                 Time spent on the discharge of patient: 30 minutes  Patient was seen and examined on the date of discharge and determined to be suitable for discharge.         Davidson Rodriguez MD  Department of Hospital Medicine  [facility]   no

## 2021-03-28 LAB
ALBUMIN SERPL ELPH-MCNC: 2.5 G/DL — LOW (ref 3.3–5)
ALBUMIN SERPL ELPH-MCNC: 2.6 G/DL — LOW (ref 3.3–5)
ALP SERPL-CCNC: 65 U/L — SIGNIFICANT CHANGE UP (ref 40–120)
ALP SERPL-CCNC: 68 U/L — SIGNIFICANT CHANGE UP (ref 40–120)
ALT FLD-CCNC: 127 U/L — HIGH (ref 12–78)
ALT FLD-CCNC: 131 U/L — HIGH (ref 12–78)
ANION GAP SERPL CALC-SCNC: 5 MMOL/L — SIGNIFICANT CHANGE UP (ref 5–17)
AST SERPL-CCNC: 54 U/L — HIGH (ref 15–37)
AST SERPL-CCNC: 55 U/L — HIGH (ref 15–37)
BILIRUB DIRECT SERPL-MCNC: <0.1 MG/DL — SIGNIFICANT CHANGE UP (ref 0–0.2)
BILIRUB INDIRECT FLD-MCNC: >0.3 MG/DL — SIGNIFICANT CHANGE UP (ref 0.2–1)
BILIRUB SERPL-MCNC: 0.4 MG/DL — SIGNIFICANT CHANGE UP (ref 0.2–1.2)
BILIRUB SERPL-MCNC: 0.4 MG/DL — SIGNIFICANT CHANGE UP (ref 0.2–1.2)
BUN SERPL-MCNC: 33 MG/DL — HIGH (ref 7–23)
CALCIUM SERPL-MCNC: 9.2 MG/DL — SIGNIFICANT CHANGE UP (ref 8.5–10.1)
CHLORIDE SERPL-SCNC: 104 MMOL/L — SIGNIFICANT CHANGE UP (ref 96–108)
CO2 SERPL-SCNC: 26 MMOL/L — SIGNIFICANT CHANGE UP (ref 22–31)
CREAT SERPL-MCNC: 1.1 MG/DL — SIGNIFICANT CHANGE UP (ref 0.5–1.3)
GLUCOSE SERPL-MCNC: 313 MG/DL — HIGH (ref 70–99)
INR BLD: 1.43 RATIO — HIGH (ref 0.88–1.16)
POTASSIUM SERPL-MCNC: 4.5 MMOL/L — SIGNIFICANT CHANGE UP (ref 3.5–5.3)
POTASSIUM SERPL-SCNC: 4.5 MMOL/L — SIGNIFICANT CHANGE UP (ref 3.5–5.3)
PROT SERPL-MCNC: 7 GM/DL — SIGNIFICANT CHANGE UP (ref 6–8.3)
PROT SERPL-MCNC: 7.1 GM/DL — SIGNIFICANT CHANGE UP (ref 6–8.3)
PROTHROM AB SERPL-ACNC: 16.3 SEC — HIGH (ref 10.6–13.6)
SODIUM SERPL-SCNC: 135 MMOL/L — SIGNIFICANT CHANGE UP (ref 135–145)

## 2021-03-28 PROCEDURE — 99232 SBSQ HOSP IP/OBS MODERATE 35: CPT | Mod: CS

## 2021-03-28 PROCEDURE — 99497 ADVNCD CARE PLAN 30 MIN: CPT | Mod: CS

## 2021-03-28 RX ORDER — INSULIN GLARGINE 100 [IU]/ML
20 INJECTION, SOLUTION SUBCUTANEOUS
Refills: 0 | Status: DISCONTINUED | OUTPATIENT
Start: 2021-03-28 | End: 2021-03-29

## 2021-03-28 RX ORDER — INSULIN GLARGINE 100 [IU]/ML
15 INJECTION, SOLUTION SUBCUTANEOUS
Refills: 0 | Status: DISCONTINUED | OUTPATIENT
Start: 2021-03-28 | End: 2021-03-28

## 2021-03-28 RX ADMIN — ENOXAPARIN SODIUM 40 MILLIGRAM(S): 100 INJECTION SUBCUTANEOUS at 10:07

## 2021-03-28 RX ADMIN — INSULIN GLARGINE 15 UNIT(S): 100 INJECTION, SOLUTION SUBCUTANEOUS at 11:14

## 2021-03-28 RX ADMIN — Medication 6: at 21:58

## 2021-03-28 RX ADMIN — INSULIN GLARGINE 20 UNIT(S): 100 INJECTION, SOLUTION SUBCUTANEOUS at 21:59

## 2021-03-28 RX ADMIN — Medication 81 MILLIGRAM(S): at 10:06

## 2021-03-28 RX ADMIN — REMDESIVIR 500 MILLIGRAM(S): 5 INJECTION INTRAVENOUS at 03:17

## 2021-03-28 RX ADMIN — Medication 4: at 17:10

## 2021-03-28 RX ADMIN — Medication 3: at 12:00

## 2021-03-28 RX ADMIN — Medication 6 MILLIGRAM(S): at 10:06

## 2021-03-28 RX ADMIN — Medication 4: at 08:07

## 2021-03-28 NOTE — PROGRESS NOTE ADULT - ASSESSMENT
66 y/o M PMHx significant for CAD, Hypertension, Hyperlipidemia, and diabetes mellitus type 2 who tested positive for COVID-19 2 weeks ago presents to  for further evaluation and management of worsening cough, and shortness of breath. The patient additionally reports generalized weakness and fatigue. The patient was noted to be hypoxic SpO2 80s on room air and 90-92% on 2L/min nasal cannula. Labs => LA 2.2, Na 134, BUN/Cr 20/1.32, Glu 258, Alb 2.6, AST//120, D-dimer 345, TnI (-) x 1. CTA Chest => No obvious pulmonary embolus. Bilateral pulmonary opacities, reflecting COVID-19 pneumonia given history. Was given remdesivir, decadron.     1. acute respiratory failure. covid-19 viral syndrome. multifocal pneumonia   - on decadron 6mg daily #3  - on remdesivir #2 per protocol monitor renal/hepatic function closely  - continue with steroids + antiviral   - monitor temps  - isolation precautions  - observe off antibiotics  - f/u inflammatory markers  - supportive care    2. other issues - care per medicine

## 2021-03-28 NOTE — PROGRESS NOTE ADULT - SUBJECTIVE AND OBJECTIVE BOX
HOSPITALIST PROGRESS NOTE:  SUBJECTIVE:  PCP:  Chief Complaint: Patient is a 65y old  Male who presents with a chief complaint of Cough, fatigue, weakness (26 Mar 2021 22:59)      HPI:  66 y/o M PMHx significant for CAD, Hypertension, Hyperlipidemia, and diabetes mellitus type 2 who tested positive for COVID-19 2 weeks ago presents to  for further evaluation and management of worsening cough, and shortness of breath. The patient additionally reports generalized weakness and fatigue. The patient was noted to be hypoxic SpO2 80s on room air and 90-92% on 2L/min nasal cannula.  Labs => LA 2.2, Na 134, BUN/Cr 20/1.32, Glu 258, Alb 2.6, AST//120, D-dimer 345, TnI (-) x 1. CTA Chest => No obvious pulmonary embolus. Bilateral pulmonary opacities, reflecting COVID-19 pneumonia given history.   (26 Mar 2021 22:59)    3/27:  Above reviewed; patients sugars uncontrolled; no other complaints other than hes agitated that hes here   3/28: PAtient sugars have been uncontrolled; his oxygen requirements have increased to 6L      Allergies:  No Known Allergies    REVIEW OF SYSTEMS:  See HPI. All other review of systems is negative unless indicated above.     OBJECTIVE  Physical Exam:  Vital Signs Last 24 Hrs  T(C): 36.7 (28 Mar 2021 08:00), Max: 36.7 (28 Mar 2021 08:00)  T(F): 98 (28 Mar 2021 08:00), Max: 98 (28 Mar 2021 08:00)  HR: 49 (28 Mar 2021 08:00) (49 - 83)  BP: 106/61 (28 Mar 2021 08:00) (106/61 - 133/85)  BP(mean): --  RR: 18 (28 Mar 2021 08:00) (18 - 20)  SpO2: 95% (28 Mar 2021 08:00) (90% - 99%)      Constitutional: NAD, awake and alert, well-developed  Neurological: AAO x 3, no focal deficits  HEENT: PERRLA, EOMI, MMM  Neck: Soft and supple, No LAD, No JVD  Respiratory: Breath sounds are clear bilaterally, No wheezing, rales or rhonchi  Cardiovascular: S1 and S2, regular rate and rhythm; no Murmurs, gallops or rubs  Gastrointestinal: Bowel Sounds present, soft, nontender, nondistended, no guarding, no rebound tenderness  Back: No CVA tenderness   Extremities: No peripheral edema  Vascular: 2+ peripheral pulses  Musculoskeletal: 5/5 strength b/l upper and lower extremities  Skin: No rashes  Breast: Deferred  Rectal: Deferred    MEDICATIONS  (STANDING):  aspirin enteric coated 81 milliGRAM(s) Oral daily  dexAMETHasone  Injectable 6 milliGRAM(s) IV Push daily  dextrose 40% Gel 15 Gram(s) Oral once  dextrose 5%. 1000 milliLiter(s) (50 mL/Hr) IV Continuous <Continuous>  dextrose 5%. 1000 milliLiter(s) (100 mL/Hr) IV Continuous <Continuous>  dextrose 50% Injectable 25 Gram(s) IV Push once  dextrose 50% Injectable 12.5 Gram(s) IV Push once  dextrose 50% Injectable 25 Gram(s) IV Push once  enoxaparin Injectable 40 milliGRAM(s) SubCutaneous daily  glucagon  Injectable 1 milliGRAM(s) IntraMuscular once  insulin lispro (ADMELOG) corrective regimen sliding scale   SubCutaneous three times a day before meals  insulin lispro (ADMELOG) corrective regimen sliding scale   SubCutaneous at bedtime  losartan 25 milliGRAM(s) Oral daily  remdesivir  IVPB   IV Intermittent     Lab Results:  CBC  CBC Full  -  ( 27 Mar 2021 08:32 )  WBC Count : 9.14 K/uL  RBC Count : 4.74 M/uL  Hemoglobin : 14.1 g/dL  Hematocrit : 41.6 %  Platelet Count - Automated : 556 K/uL  Mean Cell Volume : 87.8 fl  Mean Cell Hemoglobin : 29.7 pg  Mean Cell Hemoglobin Concentration : 33.9 gm/dL  Auto Neutrophil # : x  Auto Lymphocyte # : x  Auto Monocyte # : x  Auto Eosinophil # : x  Auto Basophil # : x  Auto Neutrophil % : x  Auto Lymphocyte % : x  Auto Monocyte % : x  Auto Eosinophil % : x  Auto Basophil % : x    .		Differential:	[] Automated		[] Manual  Chemistry                        14.1   9.14  )-----------( 556      ( 27 Mar 2021 08:32 )             41.6     03-28    135  |  104  |  33<H>  ----------------------------<  313<H>  4.5   |  26  |  1.10    Ca    9.2      28 Mar 2021 09:59    TPro  7.1  /  Alb  2.6<L>  /  TBili  0.4  /  DBili  <0.1  /  AST  54<H>  /  ALT  131<H>  /  AlkPhos  68  03-28    LIVER FUNCTIONS - ( 28 Mar 2021 09:59 )  Alb: 2.6 g/dL / Pro: 7.1 gm/dL / ALK PHOS: 68 U/L / ALT: 131 U/L / AST: 54 U/L / GGT: x           PT/INR - ( 28 Mar 2021 09:59 )   PT: 16.3 sec;   INR: 1.43 ratio         MICROBIOLOGY/CULTURES:  Culture Results:   No growth to date. (03-26 @ 19:59)  Culture Results:   No growth to date. (03-26 @ 18:55)       RADIOLOGY/EKG:    < from: Xray Chest 1 View-PORTABLE IMMEDIATE (03.26.21 @ 19:43) >      INTERPRETATION:  AP erect chest on March 26, 2021 at 7:31 PM.    COMPARISON: None available.    Heart magnified by technique.    Scattered mid laterallung infiltrates was some extension to the lung bases noted. Above findings are consistent with Covid pneumonia.    IMPRESSION: Bilateral infiltrates as above.        < end of copied text >  < from: CT Angio Chest PE Protocol w/ IV Cont (03.26.21 @ 21:06) >    IMPRESSION:    No obvious pulmonary embolus.    Bilateral pulmonary opacities, reflecting COVID-19 pneumonia given history.    Additional findings as described.        < end of copied text >

## 2021-03-28 NOTE — PROGRESS NOTE ADULT - SUBJECTIVE AND OBJECTIVE BOX
Date of service: 03-28-21 @ 16:26    pt seen and examined  on 6L NC, o2 sats > 90s  laying on his side  has dyspnea  has dry cough  feels weak    ROS: no fever or chills; denies dizziness, no HA,  no abdominal pain, no diarrhea or constipation; no dysuria, no urinary frequency, no legs pain, no rashes    MEDICATIONS  (STANDING):  aspirin enteric coated 81 milliGRAM(s) Oral daily  dexAMETHasone  Injectable 6 milliGRAM(s) IV Push daily  dextrose 40% Gel 15 Gram(s) Oral once  dextrose 5%. 1000 milliLiter(s) (50 mL/Hr) IV Continuous <Continuous>  dextrose 5%. 1000 milliLiter(s) (100 mL/Hr) IV Continuous <Continuous>  dextrose 50% Injectable 25 Gram(s) IV Push once  dextrose 50% Injectable 12.5 Gram(s) IV Push once  dextrose 50% Injectable 25 Gram(s) IV Push once  enoxaparin Injectable 40 milliGRAM(s) SubCutaneous daily  glucagon  Injectable 1 milliGRAM(s) IntraMuscular once  insulin glargine Injectable (LANTUS) 20 Unit(s) SubCutaneous two times a day  insulin lispro (ADMELOG) corrective regimen sliding scale   SubCutaneous at bedtime  insulin lispro (ADMELOG) corrective regimen sliding scale   SubCutaneous three times a day before meals  losartan 25 milliGRAM(s) Oral daily  remdesivir  IVPB 100 milliGRAM(s) IV Intermittent every 24 hours  remdesivir  IVPB   IV Intermittent     Vital Signs Last 24 Hrs  T(C): 36.7 (28 Mar 2021 08:00), Max: 36.7 (28 Mar 2021 08:00)  T(F): 98 (28 Mar 2021 08:00), Max: 98 (28 Mar 2021 08:00)  HR: 49 (28 Mar 2021 08:00) (49 - 69)  BP: 106/61 (28 Mar 2021 08:00) (106/61 - 133/85)  BP(mean): --  RR: 18 (28 Mar 2021 08:00) (18 - 19)  SpO2: 95% (28 Mar 2021 08:00) (92% - 99%)        PE:  Constitutional: frail looking  HEENT: NC/AT, EOMI, PERRLA, conjunctivae clear; ears and nose atraumatic; pharynx benign  Neck: supple; thyroid not palpable  Back: no tenderness  Respiratory: decreased breath sounds, crackles   Cardiovascular: S1S2 regular, no murmurs  Abdomen: soft, not tender, not distended, positive BS; liver and spleen WNL  Genitourinary: no suprapubic tenderness  Lymphatic: no LN palpable  Musculoskeletal: no muscle tenderness, no joint swelling or tenderness  Extremities: no pedal edema  Neurological/ Psychiatric: AxOx3, Judgement and insight normal;  moving all extremities  Skin: no rashes; no palpable lesions    Labs: all available labs reviewed                        14.1   9.14  )-----------( 556      ( 27 Mar 2021 08:32 )             41.6     03-28    135  |  104  |  33<H>  ----------------------------<  313<H>  4.5   |  26  |  1.10    Ca    9.2      28 Mar 2021 09:59    TPro  7.1  /  Alb  2.6<L>  /  TBili  0.4  /  DBili  <0.1  /  AST  54<H>  /  ALT  131<H>  /  AlkPhos  68  03-28         LIVER FUNCTIONS - ( 27 Mar 2021 08:32 )  Alb: 2.5 g/dL / Pro: 7.8 gm/dL / ALK PHOS: 64 U/L / ALT: 141 U/L / AST: 111 U/L / GGT: x           Radiology: all available radiological tests reviewed    EXAM:  CT ANGIO CHEST PE PROTOCOL IC                             PROCEDURE DATE:  03/26/2021          INTERPRETATION:  CLINICAL INFORMATION: Positive d-dimer, cough, shortness of breath, positive COVID-19 2 weeks ago, assess PE.    PROCEDURE: CT angiography of the chest was performed with intravenous contrast utilizing dedicated PE protocol. Coronal and sagittal reconstruction images were obtained. Axial MIP images were obtained from a separate workstation.    CONTRAST/COMPLICATIONS:  IV Contrast: Omnipaque 350  90 cc administered   10 cc discarded  Oral Contrast: NONE  Complications: None reported at time of study completion    COMPARISON: None.    FINDINGS: Artifact from the patient's arm and respiratory motion degrades images.    LUNGS AND AIRWAYS: Patent central airways. Predominantly peripheral linear/ground glass opacities in both lungs with mildly patchy opacities at the lung bases.  PLEURA: Trace bilateral pleural effusion. No pneumothorax.  HEART: Cardiomegaly. No pericardial effusion.  VESSELS: Adequate contrast opacification of the pulmonary arteries. No obvious pulmonary embolus. Atherosclerosis. Normal caliber of the thoracic aorta. Ectatic ascending aorta (4.5 cm in AP diameter).  MEDIASTINUM AND ANDREA: Subcentimeter lymph nodes without lymphadenopathy.  CHEST WALL AND LOWER NECK: Mild bilateral gynecomastia.  UPPER ABDOMEN: Suggests a fatty infiltration of the liver with sparing near the gallbladder fossa. Fatty atrophy of the visualized pancreas. Nonspecific bilateral perinephric stranding. Colon diverticulosis.  BONES: Degenerative changes of the spine. Chronic mild anterior wedging of the thoracolumbar junction.    IMPRESSION:    No obvious pulmonary embolus.    Bilateral pulmonary opacities, reflecting COVID-19 pneumonia given history.    Additional findings as described.          Advanced directives addressed: full resuscitation

## 2021-03-28 NOTE — PROVIDER CONTACT NOTE (OTHER) - SITUATION
Patient Covid positive. Hx. of HTN, Type 2 Diabetes. Patient with bedtime BG of 460. Patient to receive lantus 10 units at bedtime.

## 2021-03-28 NOTE — PROGRESS NOTE ADULT - ASSESSMENT
64 y/o M PMHx significant for CAD, Hypertension, Hyperlipidemia, and diabetes mellitus type 2 who tested positive for COVID-19 2 weeks ago presents to  for further evaluation and management of worsening cough, and shortness of breath. The patient additionally reports generalized weakness and fatigue. The patient was noted to be hypoxic SpO2 80s on room air and 90-92% on 2L/min nasal cannula.  Labs => LA 2.2, Na 134, BUN/Cr 20/1.32, Glu 258, Alb 2.6, AST//120, D-dimer 345, TnI (-) x 1. CTA Chest => No obvious pulmonary embolus. Bilateral pulmonary opacities, reflecting COVID-19 pneumonia given history.    #Viral Pneumonia secondary to Novel Coronavirus Infection  ~cont. with NC 6L 95%   -Ddimer 345, CTA neg for PE  ~cont. Dexamethasone 6mg IVP daily#2  ~cont. Remdesevir#2 per protocol  ~cont. anti-pyretics with Acetaminophen 650 mg PO q4h PRN fever. Limit use of NSAIDs.  ~cont. HFA albuterol Q6 hour PRN via MDI.   ~f/u am labs     #CAD/Hypertension  ~cont. ASA 81mg po daily  ~cont. Losartan 25mg po daily    #Hyperlipidemia  ~takes Lovaza 1000mg po bid    #Diabetes mellitus type 2 - uncontrolled due to steroids  ~FS qAC/HS  ~cont. ISS per protocol  -increase Lantus 20 BID    #Vte ppx  ~cont. LMWH 40 mg SQ daily      #Code Status - Advanced Care Planning 35 minutes.  Discussion with patient and regarding advance directives. Even though he is 65,  At this time he would like to be DNR/DNI. Form signed.

## 2021-03-29 LAB
ALBUMIN SERPL ELPH-MCNC: 2.5 G/DL — LOW (ref 3.3–5)
ALP SERPL-CCNC: 63 U/L — SIGNIFICANT CHANGE UP (ref 40–120)
ALT FLD-CCNC: 96 U/L — HIGH (ref 12–78)
ANION GAP SERPL CALC-SCNC: 7 MMOL/L — SIGNIFICANT CHANGE UP (ref 5–17)
AST SERPL-CCNC: 29 U/L — SIGNIFICANT CHANGE UP (ref 15–37)
BILIRUB SERPL-MCNC: 0.4 MG/DL — SIGNIFICANT CHANGE UP (ref 0.2–1.2)
BUN SERPL-MCNC: 29 MG/DL — HIGH (ref 7–23)
CALCIUM SERPL-MCNC: 8.7 MG/DL — SIGNIFICANT CHANGE UP (ref 8.5–10.1)
CHLORIDE SERPL-SCNC: 104 MMOL/L — SIGNIFICANT CHANGE UP (ref 96–108)
CO2 SERPL-SCNC: 26 MMOL/L — SIGNIFICANT CHANGE UP (ref 22–31)
CREAT SERPL-MCNC: 1.12 MG/DL — SIGNIFICANT CHANGE UP (ref 0.5–1.3)
CULTURE RESULTS: SIGNIFICANT CHANGE UP
GLUCOSE SERPL-MCNC: 298 MG/DL — HIGH (ref 70–99)
INR BLD: 1.46 RATIO — HIGH (ref 0.88–1.16)
POTASSIUM SERPL-MCNC: 4.1 MMOL/L — SIGNIFICANT CHANGE UP (ref 3.5–5.3)
POTASSIUM SERPL-SCNC: 4.1 MMOL/L — SIGNIFICANT CHANGE UP (ref 3.5–5.3)
PROT SERPL-MCNC: 6.6 GM/DL — SIGNIFICANT CHANGE UP (ref 6–8.3)
PROTHROM AB SERPL-ACNC: 16.8 SEC — HIGH (ref 10.6–13.6)
SODIUM SERPL-SCNC: 137 MMOL/L — SIGNIFICANT CHANGE UP (ref 135–145)
SPECIMEN SOURCE: SIGNIFICANT CHANGE UP

## 2021-03-29 PROCEDURE — 99232 SBSQ HOSP IP/OBS MODERATE 35: CPT | Mod: CS

## 2021-03-29 RX ORDER — TIOTROPIUM BROMIDE 18 UG/1
1 CAPSULE ORAL; RESPIRATORY (INHALATION) DAILY
Refills: 0 | Status: DISCONTINUED | OUTPATIENT
Start: 2021-03-29 | End: 2021-03-31

## 2021-03-29 RX ORDER — INSULIN GLARGINE 100 [IU]/ML
26 INJECTION, SOLUTION SUBCUTANEOUS
Refills: 0 | Status: DISCONTINUED | OUTPATIENT
Start: 2021-03-29 | End: 2021-03-31

## 2021-03-29 RX ORDER — BUDESONIDE AND FORMOTEROL FUMARATE DIHYDRATE 160; 4.5 UG/1; UG/1
2 AEROSOL RESPIRATORY (INHALATION)
Refills: 0 | Status: DISCONTINUED | OUTPATIENT
Start: 2021-03-29 | End: 2021-03-31

## 2021-03-29 RX ADMIN — INSULIN GLARGINE 20 UNIT(S): 100 INJECTION, SOLUTION SUBCUTANEOUS at 09:14

## 2021-03-29 RX ADMIN — INSULIN GLARGINE 26 UNIT(S): 100 INJECTION, SOLUTION SUBCUTANEOUS at 21:45

## 2021-03-29 RX ADMIN — Medication 5: at 17:18

## 2021-03-29 RX ADMIN — Medication 4: at 12:44

## 2021-03-29 RX ADMIN — Medication 6: at 21:45

## 2021-03-29 RX ADMIN — Medication 81 MILLIGRAM(S): at 09:07

## 2021-03-29 RX ADMIN — Medication 6 MILLIGRAM(S): at 09:07

## 2021-03-29 RX ADMIN — REMDESIVIR 500 MILLIGRAM(S): 5 INJECTION INTRAVENOUS at 03:25

## 2021-03-29 RX ADMIN — ENOXAPARIN SODIUM 40 MILLIGRAM(S): 100 INJECTION SUBCUTANEOUS at 09:07

## 2021-03-29 RX ADMIN — Medication 4: at 09:15

## 2021-03-29 NOTE — PROGRESS NOTE ADULT - SUBJECTIVE AND OBJECTIVE BOX
Date of service: 03-29-21 @ 12:02    pt seen and examined  on 6L NC, o2 sats > 90s  laying on his side  feels weak, has dry cough  afebrile    ROS: no fever or chills; denies dizziness, no HA, no abdominal pain, no diarrhea or constipation; no dysuria, no urinary frequency, no legs pain, no rashes    MEDICATIONS  (STANDING):  aspirin enteric coated 81 milliGRAM(s) Oral daily  dexAMETHasone  Injectable 6 milliGRAM(s) IV Push daily  dextrose 40% Gel 15 Gram(s) Oral once  dextrose 5%. 1000 milliLiter(s) (50 mL/Hr) IV Continuous <Continuous>  dextrose 5%. 1000 milliLiter(s) (100 mL/Hr) IV Continuous <Continuous>  dextrose 50% Injectable 25 Gram(s) IV Push once  dextrose 50% Injectable 12.5 Gram(s) IV Push once  dextrose 50% Injectable 25 Gram(s) IV Push once  enoxaparin Injectable 40 milliGRAM(s) SubCutaneous daily  glucagon  Injectable 1 milliGRAM(s) IntraMuscular once  insulin glargine Injectable (LANTUS) 20 Unit(s) SubCutaneous two times a day  insulin lispro (ADMELOG) corrective regimen sliding scale   SubCutaneous at bedtime  insulin lispro (ADMELOG) corrective regimen sliding scale   SubCutaneous three times a day before meals  losartan 25 milliGRAM(s) Oral daily  remdesivir  IVPB 100 milliGRAM(s) IV Intermittent every 24 hours  remdesivir  IVPB   IV Intermittent       Vital Signs Last 24 Hrs  T(C): 36.1 (29 Mar 2021 08:46), Max: 36.4 (28 Mar 2021 23:12)  T(F): 97 (29 Mar 2021 08:46), Max: 97.6 (28 Mar 2021 23:12)  HR: 58 (29 Mar 2021 08:46) (48 - 65)  BP: 116/77 (29 Mar 2021 08:46) (110/67 - 116/77)  BP(mean): --  RR: 20 (29 Mar 2021 08:46) (20 - 20)  SpO2: 92% (29 Mar 2021 08:46) (92% - 97%)      PE:  Constitutional: frail looking  HEENT: NC/AT, EOMI, PERRLA, conjunctivae clear; ears and nose atraumatic; pharynx benign  Neck: supple; thyroid not palpable  Back: no tenderness  Respiratory: decreased breath sounds, crackles   Cardiovascular: S1S2 regular, no murmurs  Abdomen: soft, not tender, not distended, positive BS; liver and spleen WNL  Genitourinary: no suprapubic tenderness  Lymphatic: no LN palpable  Musculoskeletal: no muscle tenderness, no joint swelling or tenderness  Extremities: no pedal edema  Neurological/ Psychiatric: AxOx3, Judgement and insight normal;  moving all extremities  Skin: no rashes; no palpable lesions    Labs: all available labs reviewed               03-29    137  |  104  |  29<H>  ----------------------------<  298<H>  4.1   |  26  |  1.12    Ca    8.7      29 Mar 2021 09:05    TPro  6.6  /  Alb  2.5<L>  /  TBili  0.4  /  DBili  0.1  /  AST  29  /  ALT  96<H>  /  AlkPhos  63  03-29        C-Reactive Protein, Serum: 135 mg/L (03-26-21 @ 18:55)  D-Dimer Assay, Quantitative: 345 ng/mL DDU (03-26-21 @ 18:55)  Ferritin, Serum: 2675 ng/mL (03-26-21 @ 18:55)    Radiology: all available radiological tests reviewed    EXAM:  CT ANGIO CHEST PE PROTOCOL IC                             PROCEDURE DATE:  03/26/2021          INTERPRETATION:  CLINICAL INFORMATION: Positive d-dimer, cough, shortness of breath, positive COVID-19 2 weeks ago, assess PE.    PROCEDURE: CT angiography of the chest was performed with intravenous contrast utilizing dedicated PE protocol. Coronal and sagittal reconstruction images were obtained. Axial MIP images were obtained from a separate workstation.    CONTRAST/COMPLICATIONS:  IV Contrast: Omnipaque 350  90 cc administered   10 cc discarded  Oral Contrast: NONE  Complications: None reported at time of study completion    COMPARISON: None.    FINDINGS: Artifact from the patient's arm and respiratory motion degrades images.    LUNGS AND AIRWAYS: Patent central airways. Predominantly peripheral linear/ground glass opacities in both lungs with mildly patchy opacities at the lung bases.  PLEURA: Trace bilateral pleural effusion. No pneumothorax.  HEART: Cardiomegaly. No pericardial effusion.  VESSELS: Adequate contrast opacification of the pulmonary arteries. No obvious pulmonary embolus. Atherosclerosis. Normal caliber of the thoracic aorta. Ectatic ascending aorta (4.5 cm in AP diameter).  MEDIASTINUM AND ANDREA: Subcentimeter lymph nodes without lymphadenopathy.  CHEST WALL AND LOWER NECK: Mild bilateral gynecomastia.  UPPER ABDOMEN: Suggests a fatty infiltration of the liver with sparing near the gallbladder fossa. Fatty atrophy of the visualized pancreas. Nonspecific bilateral perinephric stranding. Colon diverticulosis.  BONES: Degenerative changes of the spine. Chronic mild anterior wedging of the thoracolumbar junction.    IMPRESSION:    No obvious pulmonary embolus.    Bilateral pulmonary opacities, reflecting COVID-19 pneumonia given history.    Additional findings as described.          Advanced directives addressed: full resuscitation

## 2021-03-29 NOTE — PROGRESS NOTE ADULT - SUBJECTIVE AND OBJECTIVE BOX
Cheif complaints and Diagnosis: covid 19/ resp failure    Subjective: no complaints; currently on 6l nc      REVIEW OF SYSTEMS:    CONSTITUTIONAL: No weakness, fevers or chills  EYES/ENT: No visual changes;  No vertigo or throat pain   NECK: No pain or stiffness  RESPIRATORY: No cough, wheezing, hemoptysis; No shortness of breath  CARDIOVASCULAR: No chest pain or palpitations  GASTROINTESTINAL: No abdominal or epigastric pain. No nausea, vomiting, or hematemesis; No diarrhea or constipation. No melena or hematochezia.  GENITOURINARY: No dysuria, frequency or hematuria  NEUROLOGICAL: No numbness or weakness  SKIN: No itching, burning, rashes, or lesions   All other review of systems is negative unless indicated above      Vital Signs Last 24 Hrs  T(C): 36.1 (29 Mar 2021 08:46), Max: 36.4 (28 Mar 2021 23:12)  T(F): 97 (29 Mar 2021 08:46), Max: 97.6 (28 Mar 2021 23:12)  HR: 58 (29 Mar 2021 08:46) (58 - 65)  BP: 116/77 (29 Mar 2021 08:46) (110/73 - 116/77)  BP(mean): --  RR: 20 (29 Mar 2021 08:46) (20 - 20)  SpO2: 94% (29 Mar 2021 14:18) (92% - 97%)    HEENT:   pupils equal and reactive, EOMI, no oropharyngeal lesions, erythema, exudates, oral thrush    NECK:   supple, no carotid bruits, no palpable lymph nodes, no thyromegaly    CV:  +S1, +S2, regular, no murmurs or rubs    RESP:   lungs clear to auscultation bilaterally, no wheezing, rales, rhonchi, good air entry bilaterally    BREAST:  not examined    GI:  abdomen soft, non-tender, non-distended, normal BS, no bruits, no abdominal masses, no palpable masses    RECTAL:  not examined    :  not examined    MSK:   normal muscle tone, no atrophy, no rigidity, no contractions    EXT:   no clubbing, no cyanosis, no edema, no calf pain, swelling or erythema    VASCULAR:  pulses equal and symmetric in the upper and lower extremities    NEURO:  AAOX3, no focal neurological deficits, follows all commands, able to move extremities spontaneously    SKIN:  no ulcers, lesions or rashes    MEDICATIONS  (STANDING):  aspirin enteric coated 81 milliGRAM(s) Oral daily  dexAMETHasone  Injectable 6 milliGRAM(s) IV Push daily  dextrose 40% Gel 15 Gram(s) Oral once  dextrose 5%. 1000 milliLiter(s) (50 mL/Hr) IV Continuous <Continuous>  dextrose 5%. 1000 milliLiter(s) (100 mL/Hr) IV Continuous <Continuous>  dextrose 50% Injectable 25 Gram(s) IV Push once  dextrose 50% Injectable 12.5 Gram(s) IV Push once  dextrose 50% Injectable 25 Gram(s) IV Push once  enoxaparin Injectable 40 milliGRAM(s) SubCutaneous daily  glucagon  Injectable 1 milliGRAM(s) IntraMuscular once  insulin glargine Injectable (LANTUS) 20 Unit(s) SubCutaneous two times a day  insulin lispro (ADMELOG) corrective regimen sliding scale   SubCutaneous at bedtime  insulin lispro (ADMELOG) corrective regimen sliding scale   SubCutaneous three times a day before meals  losartan 25 milliGRAM(s) Oral daily  remdesivir  IVPB 100 milliGRAM(s) IV Intermittent every 24 hours  remdesivir  IVPB   IV Intermittent     MEDICATIONS  (PRN):  acetaminophen   Tablet .. 650 milliGRAM(s) Oral every 4 hours PRN Temp greater or equal to 38.5C (101.3F)  ALBUTerol    90 MICROgram(s) HFA Inhaler 2 Puff(s) Inhalation every 4 hours PRN Shortness of Breath and/or Wheezing  guaifenesin/dextromethorphan  Syrup 10 milliLiter(s) Oral every 4 hours PRN Cough  ondansetron Injectable 4 milliGRAM(s) IV Push every 6 hours PRN Nausea and/or Vomiting      29 Mar 2021 09:05    137    |  104    |  29     ----------------------------<  298    4.1     |  26     |  1.12     Ca    8.7        29 Mar 2021 09:05    TPro  6.6    /  Alb  2.5    /  TBili  0.4    /  DBili  0.1    /  AST  29     /  ALT  96     /  AlkPhos  63     29 Mar 2021 09:05  LIVER FUNCTIONS - ( 29 Mar 2021 09:05 )  Alb: 2.5 g/dL / Pro: 6.6 gm/dL / ALK PHOS: 63 U/L / ALT: 96 U/L / AST: 29 U/L / GGT: x         PT/INR - ( 29 Mar 2021 09:05 )   PT: 16.8 sec;   INR: 1.46 ratio                   PT/INR - ( 29 Mar 2021 09:05 )   PT: 16.8 sec;   INR: 1.46 ratio             Assessment and Plan:     66 y/o M PMHx significant for CAD, Hypertension, Hyperlipidemia, and diabetes mellitus type 2 who tested positive for COVID-19 2 weeks ago presents to  for further evaluation and management of worsening cough, and shortness of breath. The patient additionally reports generalized weakness and fatigue. The patient was noted to be hypoxic SpO2 80s on room air and 90-92% on 2L/min nasal cannula.  Labs => LA 2.2, Na 134, BUN/Cr 20/1.32, Glu 258, Alb 2.6, AST//120, D-dimer 345, TnI (-) x 1. CTA Chest => No obvious pulmonary embolus. Bilateral pulmonary opacities, reflecting COVID-19 pneumonia given history.    #Viral Pneumonia secondary to Novel Coronavirus Infection  ~cont. with NC 6L 95%   -Ddimer 345, CTA neg for PE  -dexamethason/ remdesivir day 3   ~cont. anti-pyretics with Acetaminophen 650 mg PO q4h PRN fever. Limit use of NSAIDs.  ~cont. HFA albuterol Q6 hour PRN via MDI.   ~f/u am labs     #CAD/Hypertension  ~cont. ASA 81mg po daily  ~cont. Losartan 25mg po daily    #Hyperlipidemia  ~takes Lovaza 1000mg po bid    #Diabetes mellitus type 2 - uncontrolled due to steroids  ~FS qAC/HS  ~cont. ISS per protocol  -increase Lantus 20 BID    #Vte ppx  ~cont. LMWH 40 mg SQ daily      #Code Status - Advanced Care Planning 35 minutes.  Discussion with patient and regarding advance directives. Even though he is 65,  At this time he would like to be DNR/DNI. Form signed.

## 2021-03-29 NOTE — PROGRESS NOTE ADULT - ASSESSMENT
64 y/o M PMHx significant for CAD, Hypertension, Hyperlipidemia, and diabetes mellitus type 2 who tested positive for COVID-19 2 weeks ago presents to  for further evaluation and management of worsening cough, and shortness of breath. The patient additionally reports generalized weakness and fatigue. The patient was noted to be hypoxic SpO2 80s on room air and 90-92% on 2L/min nasal cannula. Labs => LA 2.2, Na 134, BUN/Cr 20/1.32, Glu 258, Alb 2.6, AST//120, D-dimer 345, TnI (-) x 1. CTA Chest => No obvious pulmonary embolus. Bilateral pulmonary opacities, reflecting COVID-19 pneumonia given history. Was given remdesivir, decadron.     1. acute respiratory failure. covid-19 viral syndrome. multifocal pneumonia   - on decadron 6mg daily #3  - on remdesivir #3 per protocol monitor renal/hepatic function closely  - continue with steroids + antiviral  - monitor temps  - isolation precautions  - observe off antibiotics  - f/u inflammatory markers   - supportive care    2. other issues - care per medicine

## 2021-03-30 LAB
ALBUMIN SERPL ELPH-MCNC: 2.4 G/DL — LOW (ref 3.3–5)
ALP SERPL-CCNC: 54 U/L — SIGNIFICANT CHANGE UP (ref 40–120)
ALT FLD-CCNC: 84 U/L — HIGH (ref 12–78)
ANION GAP SERPL CALC-SCNC: 5 MMOL/L — SIGNIFICANT CHANGE UP (ref 5–17)
AST SERPL-CCNC: 26 U/L — SIGNIFICANT CHANGE UP (ref 15–37)
BILIRUB SERPL-MCNC: 0.3 MG/DL — SIGNIFICANT CHANGE UP (ref 0.2–1.2)
BUN SERPL-MCNC: 27 MG/DL — HIGH (ref 7–23)
CALCIUM SERPL-MCNC: 8.3 MG/DL — LOW (ref 8.5–10.1)
CHLORIDE SERPL-SCNC: 106 MMOL/L — SIGNIFICANT CHANGE UP (ref 96–108)
CO2 SERPL-SCNC: 27 MMOL/L — SIGNIFICANT CHANGE UP (ref 22–31)
CREAT SERPL-MCNC: 1.05 MG/DL — SIGNIFICANT CHANGE UP (ref 0.5–1.3)
GLUCOSE SERPL-MCNC: 262 MG/DL — HIGH (ref 70–99)
INR BLD: 1.3 RATIO — HIGH (ref 0.88–1.16)
POTASSIUM SERPL-MCNC: 4.5 MMOL/L — SIGNIFICANT CHANGE UP (ref 3.5–5.3)
POTASSIUM SERPL-SCNC: 4.5 MMOL/L — SIGNIFICANT CHANGE UP (ref 3.5–5.3)
PROT SERPL-MCNC: 6 GM/DL — SIGNIFICANT CHANGE UP (ref 6–8.3)
PROTHROM AB SERPL-ACNC: 14.9 SEC — HIGH (ref 10.6–13.6)
SODIUM SERPL-SCNC: 138 MMOL/L — SIGNIFICANT CHANGE UP (ref 135–145)

## 2021-03-30 PROCEDURE — 99232 SBSQ HOSP IP/OBS MODERATE 35: CPT | Mod: CS

## 2021-03-30 PROCEDURE — 93010 ELECTROCARDIOGRAM REPORT: CPT

## 2021-03-30 RX ADMIN — Medication 8: at 21:55

## 2021-03-30 RX ADMIN — INSULIN GLARGINE 26 UNIT(S): 100 INJECTION, SOLUTION SUBCUTANEOUS at 21:55

## 2021-03-30 RX ADMIN — INSULIN GLARGINE 26 UNIT(S): 100 INJECTION, SOLUTION SUBCUTANEOUS at 08:13

## 2021-03-30 RX ADMIN — Medication 5: at 17:27

## 2021-03-30 RX ADMIN — Medication 3: at 08:12

## 2021-03-30 RX ADMIN — LOSARTAN POTASSIUM 25 MILLIGRAM(S): 100 TABLET, FILM COATED ORAL at 12:23

## 2021-03-30 RX ADMIN — Medication 81 MILLIGRAM(S): at 12:21

## 2021-03-30 RX ADMIN — Medication 6 MILLIGRAM(S): at 12:22

## 2021-03-30 RX ADMIN — TIOTROPIUM BROMIDE 1 CAPSULE(S): 18 CAPSULE ORAL; RESPIRATORY (INHALATION) at 09:00

## 2021-03-30 RX ADMIN — REMDESIVIR 500 MILLIGRAM(S): 5 INJECTION INTRAVENOUS at 03:55

## 2021-03-30 RX ADMIN — Medication 4: at 12:23

## 2021-03-30 RX ADMIN — ENOXAPARIN SODIUM 40 MILLIGRAM(S): 100 INJECTION SUBCUTANEOUS at 12:23

## 2021-03-30 NOTE — PROGRESS NOTE ADULT - SUBJECTIVE AND OBJECTIVE BOX
Cheif complaints and Diagnosis:  viral PNA/ covid     Subjective: no complaints; on 2l nc 96%      REVIEW OF SYSTEMS:    CONSTITUTIONAL: No weakness, fevers or chills  EYES/ENT: No visual changes;  No vertigo or throat pain   NECK: No pain or stiffness  RESPIRATORY: No cough, wheezing, hemoptysis; No shortness of breath  CARDIOVASCULAR: No chest pain or palpitations  GASTROINTESTINAL: No abdominal or epigastric pain. No nausea, vomiting, or hematemesis; No diarrhea or constipation. No melena or hematochezia.  GENITOURINARY: No dysuria, frequency or hematuria  NEUROLOGICAL: No numbness or weakness  SKIN: No itching, burning, rashes, or lesions   All other review of systems is negative unless indicated above      Vital Signs Last 24 Hrs  T(C): 35.3 (30 Mar 2021 08:02), Max: 36.4 (29 Mar 2021 23:51)  T(F): 95.5 (30 Mar 2021 08:02), Max: 97.6 (29 Mar 2021 23:51)  HR: 61 (30 Mar 2021 12:11) (43 - 61)  BP: 122/68 (30 Mar 2021 12:11) (102/65 - 124/65)  BP(mean): --  RR: 18 (30 Mar 2021 08:02) (18 - 18)  SpO2: 100% (30 Mar 2021 08:02) (93% - 100%)    HEENT:   pupils equal and reactive, EOMI, no oropharyngeal lesions, erythema, exudates, oral thrush    NECK:   supple, no carotid bruits, no palpable lymph nodes, no thyromegaly    CV:  +S1, +S2, regular, no murmurs or rubs    RESP:   lungs clear to auscultation bilaterally, no wheezing, rales, rhonchi, good air entry bilaterally    BREAST:  not examined    GI:  abdomen soft, non-tender, non-distended, normal BS, no bruits, no abdominal masses, no palpable masses    RECTAL:  not examined    :  not examined    MSK:   normal muscle tone, no atrophy, no rigidity, no contractions    EXT:   no clubbing, no cyanosis, no edema, no calf pain, swelling or erythema    VASCULAR:  pulses equal and symmetric in the upper and lower extremities    NEURO:  AAOX3, no focal neurological deficits, follows all commands, able to move extremities spontaneously    SKIN:  no ulcers, lesions or rashes    MEDICATIONS  (STANDING):  aspirin enteric coated 81 milliGRAM(s) Oral daily  budesonide 160 MICROgram(s)/formoterol 4.5 MICROgram(s) Inhaler 2 Puff(s) Inhalation two times a day  dexAMETHasone  Injectable 6 milliGRAM(s) IV Push daily  dextrose 40% Gel 15 Gram(s) Oral once  dextrose 5%. 1000 milliLiter(s) (50 mL/Hr) IV Continuous <Continuous>  dextrose 5%. 1000 milliLiter(s) (100 mL/Hr) IV Continuous <Continuous>  dextrose 50% Injectable 25 Gram(s) IV Push once  dextrose 50% Injectable 12.5 Gram(s) IV Push once  dextrose 50% Injectable 25 Gram(s) IV Push once  enoxaparin Injectable 40 milliGRAM(s) SubCutaneous daily  glucagon  Injectable 1 milliGRAM(s) IntraMuscular once  insulin glargine Injectable (LANTUS) 26 Unit(s) SubCutaneous two times a day  insulin lispro (ADMELOG) corrective regimen sliding scale   SubCutaneous at bedtime  insulin lispro (ADMELOG) corrective regimen sliding scale   SubCutaneous three times a day before meals  losartan 25 milliGRAM(s) Oral daily  remdesivir  IVPB 100 milliGRAM(s) IV Intermittent every 24 hours  remdesivir  IVPB   IV Intermittent   tiotropium 18 MICROgram(s) Capsule 1 Capsule(s) Inhalation daily    MEDICATIONS  (PRN):  acetaminophen   Tablet .. 650 milliGRAM(s) Oral every 4 hours PRN Temp greater or equal to 38.5C (101.3F)  ALBUTerol    90 MICROgram(s) HFA Inhaler 2 Puff(s) Inhalation every 4 hours PRN Shortness of Breath and/or Wheezing  guaifenesin/dextromethorphan  Syrup 10 milliLiter(s) Oral every 4 hours PRN Cough  ondansetron Injectable 4 milliGRAM(s) IV Push every 6 hours PRN Nausea and/or Vomiting      30 Mar 2021 08:02    138    |  106    |  27     ----------------------------<  262    4.5     |  27     |  1.05     Ca    8.3        30 Mar 2021 08:02    TPro  6.0    /  Alb  2.4    /  TBili  0.3    /  DBili  x      /  AST  26     /  ALT  84     /  AlkPhos  54     30 Mar 2021 08:02  LIVER FUNCTIONS - ( 30 Mar 2021 08:02 )  Alb: 2.4 g/dL / Pro: 6.0 gm/dL / ALK PHOS: 54 U/L / ALT: 84 U/L / AST: 26 U/L / GGT: x         PT/INR - ( 30 Mar 2021 08:02 )   PT: 14.9 sec;   INR: 1.30 ratio          PT/INR - ( 30 Mar 2021 08:02 )   PT: 14.9 sec;   INR: 1.30 ratio               Assessment and Plan:     66 y/o M PMHx significant for CAD, Hypertension, Hyperlipidemia, and diabetes mellitus type 2 who tested positive for COVID-19 2 weeks ago presents to  for further evaluation and management of worsening cough, and shortness of breath. The patient additionally reports generalized weakness and fatigue. The patient was noted to be hypoxic SpO2 80s on room air and 90-92% on 2L/min nasal cannula.  Labs => LA 2.2, Na 134, BUN/Cr 20/1.32, Glu 258, Alb 2.6, AST//120, D-dimer 345, TnI (-) x 1. CTA Chest => No obvious pulmonary embolus. Bilateral pulmonary opacities, reflecting COVID-19 pneumonia given history.    #Viral Pneumonia secondary to Novel Coronavirus Infection  ~cont. with NC 6L 95%   -Ddimer 345, CTA neg for PE  -dexamethason/ remdesivir day 4   ~cont. anti-pyretics with Acetaminophen 650 mg PO q4h PRN fever. Limit use of NSAIDs.  ~cont. HFA albuterol Q6 hour PRN via MDI.   ~f/u am labs     #CAD/Hypertension  ~cont. ASA 81mg po daily  ~cont. Losartan 25mg po daily    #Hyperlipidemia  ~takes Lovaza 1000mg po bid    #Diabetes mellitus type 2 - uncontrolled due to steroids  ~FS qAC/HS  ~cont. ISS per protocol  -increase Lantus 20 BID    #Vte ppx  ~cont. LMWH 40 mg SQ daily      #Code Status - Advanced Care Planning 35 minutes.  Discussion with patient and regarding advance directives. Even though he is 65,  At this time he would like to be DNR/DNI. Form signed.

## 2021-03-30 NOTE — PROGRESS NOTE ADULT - ASSESSMENT
64 y/o M PMHx significant for CAD, Hypertension, Hyperlipidemia, and diabetes mellitus type 2 who tested positive for COVID-19 2 weeks ago presents to  for further evaluation and management of worsening cough, and shortness of breath. The patient additionally reports generalized weakness and fatigue. The patient was noted to be hypoxic SpO2 80s on room air and 90-92% on 2L/min nasal cannula. Labs => LA 2.2, Na 134, BUN/Cr 20/1.32, Glu 258, Alb 2.6, AST//120, D-dimer 345, TnI (-) x 1. CTA Chest => No obvious pulmonary embolus. Bilateral pulmonary opacities, reflecting COVID-19 pneumonia given history. Was given remdesivir, decadron.     1. acute respiratory failure. covid-19 viral syndrome. multifocal pneumonia   - on decadron 6mg daily #4  - on remdesivir #4 per protocol monitor renal/hepatic function closely  - continue with steroids + antiviral  - monitor temps  - isolation precautions  - observe off antibiotics  - f/u inflammatory markers   - wean o2 as tolerated   - supportive care    2. other issues - care per medicine

## 2021-03-30 NOTE — PROGRESS NOTE ADULT - PROVIDER SPECIALTY LIST ADULT
Infectious Disease
Hospitalist
Infectious Disease
Infectious Disease

## 2021-03-30 NOTE — PROGRESS NOTE ADULT - REASON FOR ADMISSION
Cough, fatigue, weakness

## 2021-03-30 NOTE — PROGRESS NOTE ADULT - SUBJECTIVE AND OBJECTIVE BOX
Date of service: 03-29-21 @ 12:02    pt seen and examined  has dry cough  no resp distress  on 2L NC  prone position  afebrile    ROS: no fever or chills; denies dizziness, no HA, no abdominal pain, no diarrhea or constipation; no dysuria, no urinary frequency, no legs pain, no rashes    MEDICATIONS  (STANDING):  aspirin enteric coated 81 milliGRAM(s) Oral daily  dexAMETHasone  Injectable 6 milliGRAM(s) IV Push daily  dextrose 40% Gel 15 Gram(s) Oral once  dextrose 5%. 1000 milliLiter(s) (50 mL/Hr) IV Continuous <Continuous>  dextrose 5%. 1000 milliLiter(s) (100 mL/Hr) IV Continuous <Continuous>  dextrose 50% Injectable 25 Gram(s) IV Push once  dextrose 50% Injectable 12.5 Gram(s) IV Push once  dextrose 50% Injectable 25 Gram(s) IV Push once  enoxaparin Injectable 40 milliGRAM(s) SubCutaneous daily  glucagon  Injectable 1 milliGRAM(s) IntraMuscular once  insulin glargine Injectable (LANTUS) 20 Unit(s) SubCutaneous two times a day  insulin lispro (ADMELOG) corrective regimen sliding scale   SubCutaneous at bedtime  insulin lispro (ADMELOG) corrective regimen sliding scale   SubCutaneous three times a day before meals  losartan 25 milliGRAM(s) Oral daily  remdesivir  IVPB 100 milliGRAM(s) IV Intermittent every 24 hours  remdesivir  IVPB   IV Intermittent       Vital Signs Last 24 Hrs  T(C): 36.1 (29 Mar 2021 08:46), Max: 36.4 (28 Mar 2021 23:12)  T(F): 97 (29 Mar 2021 08:46), Max: 97.6 (28 Mar 2021 23:12)  HR: 58 (29 Mar 2021 08:46) (48 - 65)  BP: 116/77 (29 Mar 2021 08:46) (110/67 - 116/77)  BP(mean): --  RR: 20 (29 Mar 2021 08:46) (20 - 20)  SpO2: 92% (29 Mar 2021 08:46) (92% - 97%)      PE:  Constitutional: frail looking  HEENT: NC/AT, EOMI, PERRLA, conjunctivae clear; ears and nose atraumatic; pharynx benign  Neck: supple; thyroid not palpable  Back: no tenderness  Respiratory: decreased breath sounds, crackles   Cardiovascular: S1S2 regular, no murmurs  Abdomen: soft, not tender, not distended, positive BS; liver and spleen WNL  Genitourinary: no suprapubic tenderness  Lymphatic: no LN palpable  Musculoskeletal: no muscle tenderness, no joint swelling or tenderness  Extremities: no pedal edema  Neurological/ Psychiatric: AxOx3, Judgement and insight normal;  moving all extremities  Skin: no rashes; no palpable lesions    Labs: all available labs reviewed               03-29    137  |  104  |  29<H>  ----------------------------<  298<H>  4.1   |  26  |  1.12    Ca    8.7      29 Mar 2021 09:05    TPro  6.6  /  Alb  2.5<L>  /  TBili  0.4  /  DBili  0.1  /  AST  29  /  ALT  96<H>  /  AlkPhos  63  03-29    C-Reactive Protein, Serum: 135 mg/L (03-26-21 @ 18:55)  D-Dimer Assay, Quantitative: 345 ng/mL DDU (03-26-21 @ 18:55)  Ferritin, Serum: 2675 ng/mL (03-26-21 @ 18:55)      Radiology: all available radiological tests reviewed    EXAM:  CT ANGIO CHEST PE PROTOCOL IC                             PROCEDURE DATE:  03/26/2021          INTERPRETATION:  CLINICAL INFORMATION: Positive d-dimer, cough, shortness of breath, positive COVID-19 2 weeks ago, assess PE.    PROCEDURE: CT angiography of the chest was performed with intravenous contrast utilizing dedicated PE protocol. Coronal and sagittal reconstruction images were obtained. Axial MIP images were obtained from a separate workstation.    CONTRAST/COMPLICATIONS:  IV Contrast: Omnipaque 350  90 cc administered   10 cc discarded  Oral Contrast: NONE  Complications: None reported at time of study completion    COMPARISON: None.    FINDINGS: Artifact from the patient's arm and respiratory motion degrades images.    LUNGS AND AIRWAYS: Patent central airways. Predominantly peripheral linear/ground glass opacities in both lungs with mildly patchy opacities at the lung bases.  PLEURA: Trace bilateral pleural effusion. No pneumothorax.  HEART: Cardiomegaly. No pericardial effusion.  VESSELS: Adequate contrast opacification of the pulmonary arteries. No obvious pulmonary embolus. Atherosclerosis. Normal caliber of the thoracic aorta. Ectatic ascending aorta (4.5 cm in AP diameter).  MEDIASTINUM AND ANDREA: Subcentimeter lymph nodes without lymphadenopathy.  CHEST WALL AND LOWER NECK: Mild bilateral gynecomastia.  UPPER ABDOMEN: Suggests a fatty infiltration of the liver with sparing near the gallbladder fossa. Fatty atrophy of the visualized pancreas. Nonspecific bilateral perinephric stranding. Colon diverticulosis.  BONES: Degenerative changes of the spine. Chronic mild anterior wedging of the thoracolumbar junction.    IMPRESSION:    No obvious pulmonary embolus.    Bilateral pulmonary opacities, reflecting COVID-19 pneumonia given history.    Additional findings as described.          Advanced directives addressed: full resuscitation

## 2021-03-30 NOTE — PROVIDER CONTACT NOTE (CRITICAL VALUE NOTIFICATION) - SITUATION
Patient given 26 units lantus and 8 units admelog per order. MD made aware of sugar. Patient in NAD, no complaints. Will continue to monitor.

## 2021-03-31 VITALS
DIASTOLIC BLOOD PRESSURE: 70 MMHG | RESPIRATION RATE: 18 BRPM | TEMPERATURE: 97 F | SYSTOLIC BLOOD PRESSURE: 117 MMHG | HEART RATE: 46 BPM | OXYGEN SATURATION: 97 %

## 2021-03-31 LAB
ALBUMIN SERPL ELPH-MCNC: 2.5 G/DL — LOW (ref 3.3–5)
ALP SERPL-CCNC: 53 U/L — SIGNIFICANT CHANGE UP (ref 40–120)
ALT FLD-CCNC: 72 U/L — SIGNIFICANT CHANGE UP (ref 12–78)
ANION GAP SERPL CALC-SCNC: 8 MMOL/L — SIGNIFICANT CHANGE UP (ref 5–17)
AST SERPL-CCNC: 19 U/L — SIGNIFICANT CHANGE UP (ref 15–37)
BILIRUB SERPL-MCNC: 0.4 MG/DL — SIGNIFICANT CHANGE UP (ref 0.2–1.2)
BUN SERPL-MCNC: 21 MG/DL — SIGNIFICANT CHANGE UP (ref 7–23)
CALCIUM SERPL-MCNC: 8.7 MG/DL — SIGNIFICANT CHANGE UP (ref 8.5–10.1)
CHLORIDE SERPL-SCNC: 106 MMOL/L — SIGNIFICANT CHANGE UP (ref 96–108)
CO2 SERPL-SCNC: 22 MMOL/L — SIGNIFICANT CHANGE UP (ref 22–31)
CREAT SERPL-MCNC: 0.85 MG/DL — SIGNIFICANT CHANGE UP (ref 0.5–1.3)
CULTURE RESULTS: SIGNIFICANT CHANGE UP
GLUCOSE SERPL-MCNC: 231 MG/DL — HIGH (ref 70–99)
INR BLD: 1.24 RATIO — HIGH (ref 0.88–1.16)
POTASSIUM SERPL-MCNC: 4.4 MMOL/L — SIGNIFICANT CHANGE UP (ref 3.5–5.3)
POTASSIUM SERPL-SCNC: 4.4 MMOL/L — SIGNIFICANT CHANGE UP (ref 3.5–5.3)
PROT SERPL-MCNC: 6.1 GM/DL — SIGNIFICANT CHANGE UP (ref 6–8.3)
PROTHROM AB SERPL-ACNC: 14.4 SEC — HIGH (ref 10.6–13.6)
SODIUM SERPL-SCNC: 136 MMOL/L — SIGNIFICANT CHANGE UP (ref 135–145)
SPECIMEN SOURCE: SIGNIFICANT CHANGE UP

## 2021-03-31 PROCEDURE — 99239 HOSP IP/OBS DSCHRG MGMT >30: CPT | Mod: CS

## 2021-03-31 RX ORDER — ALBUTEROL 90 UG/1
2 AEROSOL, METERED ORAL
Qty: 1 | Refills: 0
Start: 2021-03-31 | End: 2021-04-29

## 2021-03-31 RX ADMIN — Medication 2: at 08:43

## 2021-03-31 RX ADMIN — Medication 5: at 12:19

## 2021-03-31 RX ADMIN — INSULIN GLARGINE 26 UNIT(S): 100 INJECTION, SOLUTION SUBCUTANEOUS at 08:44

## 2021-03-31 RX ADMIN — ENOXAPARIN SODIUM 40 MILLIGRAM(S): 100 INJECTION SUBCUTANEOUS at 08:45

## 2021-03-31 RX ADMIN — LOSARTAN POTASSIUM 25 MILLIGRAM(S): 100 TABLET, FILM COATED ORAL at 08:45

## 2021-03-31 RX ADMIN — REMDESIVIR 500 MILLIGRAM(S): 5 INJECTION INTRAVENOUS at 03:34

## 2021-03-31 RX ADMIN — Medication 6 MILLIGRAM(S): at 08:45

## 2021-03-31 RX ADMIN — Medication 81 MILLIGRAM(S): at 08:44

## 2021-03-31 NOTE — DISCHARGE NOTE PROVIDER - CARE PROVIDER_API CALL
Darnell Mckeon)  Hematology; Internal Medicine  [address] [place] [address]  Phone: [phone]  Fax: [phone]  Follow Up Time:

## 2021-03-31 NOTE — DISCHARGE NOTE PROVIDER - NSDCCPCAREPLAN_GEN_ALL_CORE_FT
PRINCIPAL DISCHARGE DIAGNOSIS  Diagnosis: COVID-19  Assessment and Plan of Treatment: *Self isolate for 10 days total from symptom onset.   *use your oxygen 24/7 for now until you no longer require it.   *keep your oxygen consistently above 92% while ambulatory and at rest.   *use your pulse oximeter to test your oxgyen frequently. When your oxygen is above 92-93%  WITHOUT THE OXYGEN when walking around, then you can stop using your oxygen support.    *use your albuterol inhlaler when you feels short of breath or wheezy  *use your Tessalon perlles for couging releif.   *Follow up with primary care doctor in one week / or when isolation period is finsihed.   *start baby asprin 81mg once a day for one month for prophyalxis agaist clotting from covid.  *Gradually resume activity over the next 1-2 weeks.         PRINCIPAL DISCHARGE DIAGNOSIS  Diagnosis: COVID-19  Assessment and Plan of Treatment:   *Self isolate for 10 days total from symptom onset.   *use your albuterol inhlaler when you feels short of breath or wheezy  *use your Tessalon perlles for couging releif.   *Follow up with primary care doctor in one week / or when isolation period is finsihed.   *start baby asprin 81mg once a day for one month for prophyalxis agaist clotting from covid.  *Gradually resume activity over the next 1-2 weeks.

## 2021-03-31 NOTE — DISCHARGE NOTE NURSING/CASE MANAGEMENT/SOCIAL WORK - PATIENT PORTAL LINK FT
You can access the FollowMyHealth Patient Portal offered by [facility] by registering at the following website: [email or web address]. By joining [facility]’s FollowMyHealth portal, you will also be able to view your health information using other applications (apps) compatible with our system.

## 2021-03-31 NOTE — DISCHARGE NOTE PROVIDER - HOSPITAL COURSE
Vital Signs Last 24 Hrs  T(C): 36.3 (31 Mar 2021 07:50), Max: 36.3 (31 Mar 2021 07:50)  T(F): 97.4 (31 Mar 2021 07:50), Max: 97.4 (31 Mar 2021 07:50)  HR: 46 (31 Mar 2021 07:50) (46 - 59)  BP: 117/70 (31 Mar 2021 07:50) (117/68 - 125/60)  BP(mean): --  RR: 18 (31 Mar 2021 07:50) (18 - 18)  SpO2: 97% (31 Mar 2021 07:50) (93% - 97%)    HEENT:   pupils equal and reactive, EOMI, no oropharyngeal lesions, erythema, exudates, oral thrush    NECK:   supple, no carotid bruits, no palpable lymph nodes, no thyromegaly    CV:  +S1, +S2, regular, no murmurs or rubs    RESP:   lungs clear to auscultation bilaterally, no wheezing, rales, rhonchi, good air entry bilaterally    BREAST:  not examined    GI:  abdomen soft, non-tender, non-distended, normal BS, no bruits, no abdominal masses, no palpable masses    RECTAL:  not examined    :  not examined    MSK:   normal muscle tone, no atrophy, no rigidity, no contractions    EXT:   no clubbing, no cyanosis, no edema, no calf pain, swelling or erythema    VASCULAR:  pulses equal and symmetric in the upper and lower extremities    NEURO:  AAOX3, no focal neurological deficits, follows all commands, able to move extremities spontaneously    SKIN:  no ulcers, lesions or rashes    31 Mar 2021 08:38    136    |  106    |  21     ----------------------------<  231    4.4     |  22     |  0.85     Ca    8.7        31 Mar 2021 08:38    TPro  6.1    /  Alb  2.5    /  TBili  0.4    /  DBili  x      /  AST  19     /  ALT  72     /  AlkPhos  53     31 Mar 2021 08:38  LIVER FUNCTIONS - ( 31 Mar 2021 08:38 )  Alb: 2.5 g/dL / Pro: 6.1 gm/dL / ALK PHOS: 53 U/L / ALT: 72 U/L / AST: 19 U/L / GGT: x         PT/INR - ( 31 Mar 2021 08:38 )   PT: 14.4 sec;   INR: 1.24 ratio                 Assessment and Plan:     64 y/o M PMHx significant for CAD, Hypertension, Hyperlipidemia, and diabetes mellitus type 2 who tested positive for COVID-19 2 weeks ago presents to  for further evaluation and management of worsening cough, and shortness of breath. The patient additionally reports generalized weakness and fatigue. The patient was noted to be hypoxic SpO2 80s on room air and 90-92% on 2L/min nasal cannula.  Labs => LA 2.2, Na 134, BUN/Cr 20/1.32, Glu 258, Alb 2.6, AST//120, D-dimer 345, TnI (-) x 1. CTA Chest => No obvious pulmonary embolus. Bilateral pulmonary opacities, reflecting COVID-19 pneumonia given history.    #Viral Pneumonia secondary to Novel Coronavirus Infection  ~cont. with NC 6L 95%   -Ddimer 345, CTA neg for PE  -dexamethason/ remdesivir day 4   ~cont. anti-pyretics with Acetaminophen 650 mg PO q4h PRN fever. Limit use of NSAIDs.  ~cont. HFA albuterol Q6 hour PRN via MDI.   ~f/u am labs

## 2021-03-31 NOTE — DISCHARGE NOTE PROVIDER - NSDCMRMEDTOKEN_GEN_ALL_CORE_FT
albuterol 90 mcg/inh inhalation aerosol: 2 puff(s) inhaled every 4 hours, As needed, Shortness of Breath and/or Wheezing  Aspirin Enteric Coated 81 mg oral delayed release tablet: 1 tab(s) orally once a day  Farxiga 10 mg oral tablet: 1 tab(s) orally once a day  glipiZIDE 10 mg oral tablet: 1 tab(s) orally once a day  losartan 25 mg oral tablet: 1 tab(s) orally once a day  Lovaza 1000 mg oral capsule: 1 cap(s) orally 2 times a day  metFORMIN 500 mg oral tablet, extended release: 1 tab(s) orally once a day

## 2021-04-01 LAB
CULTURE RESULTS: SIGNIFICANT CHANGE UP
SPECIMEN SOURCE: SIGNIFICANT CHANGE UP

## 2021-04-08 DIAGNOSIS — Z66 DO NOT RESUSCITATE: ICD-10-CM

## 2021-04-08 DIAGNOSIS — I25.10 ATHEROSCLEROTIC HEART DISEASE OF NATIVE CORONARY ARTERY WITHOUT ANGINA PECTORIS: ICD-10-CM

## 2021-04-08 DIAGNOSIS — U07.1 COVID-19: ICD-10-CM

## 2021-04-08 DIAGNOSIS — E78.5 HYPERLIPIDEMIA, UNSPECIFIED: ICD-10-CM

## 2021-04-08 DIAGNOSIS — I10 ESSENTIAL (PRIMARY) HYPERTENSION: ICD-10-CM

## 2021-04-08 DIAGNOSIS — Z79.84 LONG TERM (CURRENT) USE OF ORAL HYPOGLYCEMIC DRUGS: ICD-10-CM

## 2021-04-08 DIAGNOSIS — R26.0 ATAXIC GAIT: ICD-10-CM

## 2021-04-08 DIAGNOSIS — E11.9 TYPE 2 DIABETES MELLITUS WITHOUT COMPLICATIONS: ICD-10-CM

## 2021-04-08 DIAGNOSIS — J12.82 PNEUMONIA DUE TO CORONAVIRUS DISEASE 2019: ICD-10-CM

## 2021-04-08 DIAGNOSIS — J96.01 ACUTE RESPIRATORY FAILURE WITH HYPOXIA: ICD-10-CM

## 2021-04-08 DIAGNOSIS — Z79.82 LONG TERM (CURRENT) USE OF ASPIRIN: ICD-10-CM

## 2025-06-23 NOTE — ED ADULT NURSE NOTE - NSFALLRSKASSESSDT_ED_ALL_ED
HPI: Karlo Reddth (: 1942)    Need to update labs          Problem List:  Patient Active Problem List   Diagnosis   • PFO (patent foramen ovale)   • Brainstem stroke syndrome   • Lower urinary tract symptoms (LUTS)   • Retinal hemorrhage of right eye   • Vitamin D deficiency, unspecified   • Right ureteral stone   • BPH with obstruction/lower urinary tract symptoms   • Bradycardia   • Restless legs syndrome (RLS)   • History of kidney stones   • Gastroesophageal reflux disease without esophagitis   • Monoclonal B-cell lymphocytosis   • Elevated PSA measurement   • Neuropathy   • Elevated blood sugar   • Chronic bilateral low back pain with sciatica   • History of Nissen fundoplication   • Coronary atherosclerosis   • Hematuria   • History of transient cerebral ischemia   • Hematospermia   • Primary hypertension   • Renal cyst   • Acquired hypothyroidism   • Hyperlipidemia   • Esophageal dysmotility   • Unsteady gait when walking   • History of hyperparathyroidism   • Malignant neoplasm of thyroid gland (HCC)   • Stage 3a chronic kidney disease (HCC)   • Chronic nausea   • Encounter for diabetic foot exam ([facility])   • Foot deformity, acquired, right   • Left carpal tunnel syndrome   • Chronic gout without tophus   • Poor balance   • Right carpal tunnel syndrome   • Callus of foot   • Basal cell carcinoma of face   • Balance problem   • Thrombocytopenia, unspecified   • Interstitial lung disease ([facility])   • Cigarette nicotine dependence in remission   • Pancreatic cyst   • Decreased diffusion capacity of lung   • Alpha-1-antitrypsin deficiency carrier   • Hypogammaglobulinemia   • QT prolongation   • 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 
26-Mar-2021 19:27
